# Patient Record
Sex: MALE | Race: WHITE | NOT HISPANIC OR LATINO | Employment: STUDENT | URBAN - METROPOLITAN AREA
[De-identification: names, ages, dates, MRNs, and addresses within clinical notes are randomized per-mention and may not be internally consistent; named-entity substitution may affect disease eponyms.]

---

## 2018-02-05 ENCOUNTER — APPOINTMENT (OUTPATIENT)
Dept: GENERAL RADIOLOGY | Age: 48
End: 2018-02-05
Attending: PHYSICIAN ASSISTANT

## 2018-02-05 ENCOUNTER — HOSPITAL ENCOUNTER (OUTPATIENT)
Age: 48
Setting detail: OBSERVATION
Discharge: HOME | End: 2018-02-07
Attending: EMERGENCY MEDICINE | Admitting: HOSPITALIST

## 2018-02-05 DIAGNOSIS — K52.9 GASTROENTERITIS: ICD-10-CM

## 2018-02-05 DIAGNOSIS — N17.9 ACUTE RENAL FAILURE, UNSPECIFIED ACUTE RENAL FAILURE TYPE (CMD): Primary | ICD-10-CM

## 2018-02-05 LAB
ALBUMIN SERPL-MCNC: 3.8 G/DL (ref 3.6–5.1)
ALBUMIN/GLOB SERPL: 0.9 {RATIO} (ref 1–2.4)
ALP SERPL-CCNC: 59 UNITS/L (ref 45–117)
ALT SERPL-CCNC: 100 UNITS/L
ANION GAP BLD CALC-SCNC: 18 MMOL/L
ANION GAP SERPL CALC-SCNC: 16 MMOL/L (ref 10–20)
AST SERPL-CCNC: 105 UNITS/L
BASOPHILS # BLD AUTO: 0.1 K/MCL (ref 0–0.3)
BASOPHILS NFR BLD AUTO: 1 %
BILIRUB SERPL-MCNC: 0.5 MG/DL (ref 0.2–1)
BUN BLD-MCNC: 49 MG/DL (ref 6–20)
BUN SERPL-MCNC: 53 MG/DL (ref 6–20)
BUN/CREAT SERPL: 31 (ref 7–25)
CA-I BLD ISE-SCNC: 1.11 MMOL/L (ref 1.15–1.29)
CALCIUM SERPL-MCNC: 8.3 MG/DL (ref 8.4–10.2)
CHLORIDE BLD-SCNC: 112 MMOL/L (ref 98–107)
CHLORIDE SERPL-SCNC: 112 MMOL/L (ref 98–107)
CO2 BLD-SCNC: 23 MMOL/L (ref 19–24)
CO2 SERPL-SCNC: 21 MMOL/L (ref 21–32)
CREAT BLD-MCNC: 2.5 MG/DL (ref 0.67–1.17)
CREAT BLD-MCNC: 34 MG/DL
CREAT SERPL-MCNC: 1.72 MG/DL (ref 0.67–1.17)
CROSSMATCH EXPIRE: NORMAL
DIFFERENTIAL METHOD BLD: ABNORMAL
EOSINOPHIL # BLD AUTO: 0.4 K/MCL (ref 0.1–0.5)
EOSINOPHIL NFR SPEC: 5 %
ERYTHROCYTE [DISTWIDTH] IN BLOOD: 14.4 % (ref 11–15)
FLUAV AG SPEC QL IF: NEGATIVE
FLUBV AG SPEC QL IF: NEGATIVE
GLOBULIN SER-MCNC: 4.1 G/DL (ref 2–4)
GLUCOSE BLD-MCNC: 104 MG/DL (ref 65–99)
GLUCOSE SERPL-MCNC: 103 MG/DL (ref 65–99)
HCT VFR BLD CALC: 39.6 % (ref 39–51)
HCT VFR BLD CALC: 41 % (ref 39–51)
HGB BLD-MCNC: 13.4 G/DL (ref 13–17)
HGB BLD-MCNC: 13.9 G/DL (ref 13–17)
LIPASE SERPL-CCNC: 392 UNITS/L (ref 73–393)
LYMPHOCYTES # BLD MANUAL: 3.4 K/MCL (ref 1–4.8)
LYMPHOCYTES NFR BLD MANUAL: 45 %
MCH RBC QN AUTO: 30.4 PG (ref 26–34)
MCHC RBC AUTO-ENTMCNC: 33.8 G/DL (ref 32–36.5)
MCV RBC AUTO: 89.8 FL (ref 78–100)
MONOCYTES # BLD MANUAL: 0.3 K/MCL (ref 0.3–0.9)
MONOCYTES NFR BLD MANUAL: 4 %
NEUTROPHILS # BLD: 3.3 K/MCL (ref 1.8–7.7)
NEUTROPHILS NFR BLD AUTO: 45 %
PLATELET # BLD: 177 K/MCL (ref 140–450)
POTASSIUM BLD-SCNC: 4.5 MMOL/L (ref 3.4–5.1)
POTASSIUM SERPL-SCNC: 4.5 MMOL/L (ref 3.4–5.1)
PROT SERPL-MCNC: 7.9 G/DL (ref 6.4–8.2)
RBC # BLD: 4.41 MIL/MCL (ref 4.5–5.9)
SODIUM BLD-SCNC: 146 MMOL/L (ref 135–145)
SODIUM SERPL-SCNC: 144 MMOL/L (ref 135–145)
SPECIMEN SOURCE: NORMAL
WBC # BLD: 7.3 K/MCL (ref 4.2–11)

## 2018-02-05 PROCEDURE — 96360 HYDRATION IV INFUSION INIT: CPT

## 2018-02-05 PROCEDURE — 85025 COMPLETE CBC W/AUTO DIFF WBC: CPT

## 2018-02-05 PROCEDURE — 80053 COMPREHEN METABOLIC PANEL: CPT

## 2018-02-05 PROCEDURE — 71045 X-RAY EXAM CHEST 1 VIEW: CPT

## 2018-02-05 PROCEDURE — 71045 X-RAY EXAM CHEST 1 VIEW: CPT | Performed by: RADIOLOGY

## 2018-02-05 PROCEDURE — 87804 INFLUENZA ASSAY W/OPTIC: CPT

## 2018-02-05 PROCEDURE — 99283 EMERGENCY DEPT VISIT LOW MDM: CPT

## 2018-02-05 PROCEDURE — 10002807 HB RX 258: Performed by: EMERGENCY MEDICINE

## 2018-02-05 PROCEDURE — 80047 BASIC METABLC PNL IONIZED CA: CPT

## 2018-02-05 PROCEDURE — 83690 ASSAY OF LIPASE: CPT

## 2018-02-05 PROCEDURE — 36415 COLL VENOUS BLD VENIPUNCTURE: CPT

## 2018-02-05 RX ADMIN — SODIUM CHLORIDE 1000 ML: 9 INJECTION, SOLUTION INTRAVENOUS at 21:42

## 2018-02-05 ASSESSMENT — ENCOUNTER SYMPTOMS
PSYCHIATRIC NEGATIVE: 1
SEIZURES: 0
ABDOMINAL DISTENTION: 0
SHORTNESS OF BREATH: 0
DIARRHEA: 1
BACK PAIN: 0
VOMITING: 1
FACIAL SWELLING: 0
CHEST TIGHTNESS: 0
ABDOMINAL PAIN: 0

## 2018-02-05 ASSESSMENT — PAIN SCALES - GENERAL
PAIN_LEVEL_AT_REST: 0
PAINLEVEL_OUTOF10: 0
PAINLEVEL_OUTOF10: 0

## 2018-02-05 ASSESSMENT — MOVEMENT AND STRENGTH ASSESSMENTS: FACE_JAW: NO FACIAL DROOP

## 2018-02-06 PROBLEM — E86.0 DEHYDRATION: Status: ACTIVE | Noted: 2018-02-06

## 2018-02-06 PROBLEM — I10 ESSENTIAL HYPERTENSION: Status: ACTIVE | Noted: 2018-02-06

## 2018-02-06 PROBLEM — N17.9 ACUTE RENAL FAILURE (CMD): Status: ACTIVE | Noted: 2018-02-06

## 2018-02-06 PROBLEM — K52.9 GASTROENTERITIS: Status: ACTIVE | Noted: 2018-02-06

## 2018-02-06 LAB
ALBUMIN SERPL-MCNC: 3.2 G/DL (ref 3.6–5.1)
ALBUMIN/GLOB SERPL: 1 {RATIO} (ref 1–2.4)
ALP SERPL-CCNC: 45 UNITS/L (ref 45–117)
ALT SERPL-CCNC: 125 UNITS/L
ANION GAP SERPL CALC-SCNC: 18 MMOL/L (ref 10–20)
AST SERPL-CCNC: 125 UNITS/L
BILIRUB SERPL-MCNC: 0.3 MG/DL (ref 0.2–1)
BUN SERPL-MCNC: 44 MG/DL (ref 6–20)
BUN/CREAT SERPL: 36 (ref 7–25)
CALCIUM SERPL-MCNC: 8.2 MG/DL (ref 8.4–10.2)
CHLORIDE SERPL-SCNC: 108 MMOL/L (ref 98–107)
CO2 SERPL-SCNC: 20 MMOL/L (ref 21–32)
CREAT SERPL-MCNC: 1.23 MG/DL (ref 0.67–1.17)
GLOBULIN SER-MCNC: 3.3 G/DL (ref 2–4)
GLUCOSE SERPL-MCNC: 75 MG/DL (ref 65–99)
HEMOCCULT STL QL: NEGATIVE
MRSA DNA SPEC QL NAA+PROBE: NOT DETECTED
POTASSIUM SERPL-SCNC: 4.2 MMOL/L (ref 3.4–5.1)
PROT SERPL-MCNC: 6.5 G/DL (ref 6.4–8.2)
SODIUM SERPL-SCNC: 142 MMOL/L (ref 135–145)
SPECIMEN SOURCE: NORMAL

## 2018-02-06 PROCEDURE — 80053 COMPREHEN METABOLIC PANEL: CPT

## 2018-02-06 PROCEDURE — 10002800 HB RX 250 W HCPCS: Performed by: HOSPITALIST

## 2018-02-06 PROCEDURE — 10002807 HB RX 258: Performed by: HOSPITALIST

## 2018-02-06 PROCEDURE — 10002803 HB RX 637: Performed by: HOSPITALIST

## 2018-02-06 PROCEDURE — 10004651 HB RX, NO CHARGE ITEM: Performed by: HOSPITALIST

## 2018-02-06 PROCEDURE — 87641 MR-STAPH DNA AMP PROBE: CPT

## 2018-02-06 PROCEDURE — 99220 INITIAL OBSERVATION CARE,LEVL III: CPT | Performed by: HOSPITALIST

## 2018-02-06 PROCEDURE — G0378 HOSPITAL OBSERVATION PER HR: HCPCS

## 2018-02-06 PROCEDURE — 36415 COLL VENOUS BLD VENIPUNCTURE: CPT

## 2018-02-06 PROCEDURE — 96372 THER/PROPH/DIAG INJ SC/IM: CPT | Performed by: HOSPITALIST

## 2018-02-06 PROCEDURE — 82272 OCCULT BLD FECES 1-3 TESTS: CPT

## 2018-02-06 RX ORDER — POTASSIUM CHLORIDE 1.5 G/1.58G
20 POWDER, FOR SOLUTION ORAL EVERY 4 HOURS PRN
Status: DISCONTINUED | OUTPATIENT
Start: 2018-02-06 | End: 2018-02-07 | Stop reason: HOSPADM

## 2018-02-06 RX ORDER — AMLODIPINE BESYLATE 10 MG/1
10 TABLET ORAL DAILY
Status: DISCONTINUED | OUTPATIENT
Start: 2018-02-07 | End: 2018-02-07

## 2018-02-06 RX ORDER — MELOXICAM 15 MG/1
15 TABLET ORAL 2 TIMES DAILY
COMMUNITY

## 2018-02-06 RX ORDER — ONDANSETRON 2 MG/ML
4 INJECTION INTRAMUSCULAR; INTRAVENOUS EVERY 12 HOURS PRN
Status: DISCONTINUED | OUTPATIENT
Start: 2018-02-06 | End: 2018-02-07 | Stop reason: HOSPADM

## 2018-02-06 RX ORDER — ACETAMINOPHEN 325 MG/1
650 TABLET ORAL EVERY 4 HOURS PRN
Status: DISCONTINUED | OUTPATIENT
Start: 2018-02-06 | End: 2018-02-07 | Stop reason: HOSPADM

## 2018-02-06 RX ORDER — POTASSIUM CHLORIDE 1.5 G/1.58G
40 POWDER, FOR SOLUTION ORAL EVERY 4 HOURS PRN
Status: DISCONTINUED | OUTPATIENT
Start: 2018-02-06 | End: 2018-02-07 | Stop reason: HOSPADM

## 2018-02-06 RX ORDER — ALLOPURINOL 300 MG/1
300 TABLET ORAL DAILY
COMMUNITY

## 2018-02-06 RX ORDER — POTASSIUM CHLORIDE 14.9 MG/ML
40 INJECTION INTRAVENOUS EVERY 4 HOURS PRN
Status: DISCONTINUED | OUTPATIENT
Start: 2018-02-06 | End: 2018-02-07 | Stop reason: HOSPADM

## 2018-02-06 RX ORDER — POTASSIUM CHLORIDE 20 MEQ/1
20 TABLET, EXTENDED RELEASE ORAL EVERY 4 HOURS PRN
Status: DISCONTINUED | OUTPATIENT
Start: 2018-02-06 | End: 2018-02-07 | Stop reason: HOSPADM

## 2018-02-06 RX ORDER — MAGNESIUM SULFATE 1 G/100ML
1 INJECTION INTRAVENOUS DAILY PRN
Status: DISCONTINUED | OUTPATIENT
Start: 2018-02-06 | End: 2018-02-07 | Stop reason: HOSPADM

## 2018-02-06 RX ORDER — FAMOTIDINE 20 MG/1
20 TABLET, FILM COATED ORAL EVERY 12 HOURS SCHEDULED
Status: DISCONTINUED | OUTPATIENT
Start: 2018-02-06 | End: 2018-02-07 | Stop reason: HOSPADM

## 2018-02-06 RX ORDER — HYDRALAZINE HYDROCHLORIDE 50 MG/1
50 TABLET, FILM COATED ORAL EVERY 8 HOURS SCHEDULED
Status: DISCONTINUED | OUTPATIENT
Start: 2018-02-07 | End: 2018-02-07 | Stop reason: HOSPADM

## 2018-02-06 RX ORDER — ACETAMINOPHEN 325 MG/1
650 TABLET ORAL EVERY 4 HOURS PRN
Status: DISCONTINUED | OUTPATIENT
Start: 2018-02-06 | End: 2018-02-06 | Stop reason: SDUPTHER

## 2018-02-06 RX ORDER — BENAZEPRIL/HYDROCHLOROTHIAZIDE 20 MG-25MG
1 TABLET ORAL DAILY
COMMUNITY

## 2018-02-06 RX ORDER — 0.9 % SODIUM CHLORIDE 0.9 %
2 VIAL (ML) INJECTION EVERY 12 HOURS SCHEDULED
Status: DISCONTINUED | OUTPATIENT
Start: 2018-02-06 | End: 2018-02-07 | Stop reason: HOSPADM

## 2018-02-06 RX ORDER — ALBUTEROL SULFATE 90 UG/1
2 AEROSOL, METERED RESPIRATORY (INHALATION) EVERY 4 HOURS PRN
COMMUNITY

## 2018-02-06 RX ORDER — POTASSIUM CHLORIDE 20 MEQ/1
40 TABLET, EXTENDED RELEASE ORAL EVERY 4 HOURS PRN
Status: DISCONTINUED | OUTPATIENT
Start: 2018-02-06 | End: 2018-02-07 | Stop reason: HOSPADM

## 2018-02-06 RX ORDER — 0.9 % SODIUM CHLORIDE 0.9 %
2 VIAL (ML) INJECTION PRN
Status: DISCONTINUED | OUTPATIENT
Start: 2018-02-06 | End: 2018-02-07 | Stop reason: HOSPADM

## 2018-02-06 RX ORDER — HYDROCODONE BITARTRATE AND ACETAMINOPHEN 5; 325 MG/1; MG/1
1-2 TABLET ORAL EVERY 4 HOURS PRN
Status: DISCONTINUED | OUTPATIENT
Start: 2018-02-06 | End: 2018-02-07 | Stop reason: HOSPADM

## 2018-02-06 RX ORDER — SODIUM CHLORIDE 9 MG/ML
INJECTION, SOLUTION INTRAVENOUS CONTINUOUS
Status: DISCONTINUED | OUTPATIENT
Start: 2018-02-06 | End: 2018-02-07

## 2018-02-06 RX ORDER — ENOXAPARIN SODIUM 100 MG/ML
40 INJECTION SUBCUTANEOUS EVERY 24 HOURS
Status: DISCONTINUED | OUTPATIENT
Start: 2018-02-06 | End: 2018-02-07 | Stop reason: HOSPADM

## 2018-02-06 RX ORDER — ALLOPURINOL 300 MG/1
300 TABLET ORAL DAILY
Status: CANCELLED | OUTPATIENT
Start: 2018-02-06

## 2018-02-06 RX ORDER — PROCHLORPERAZINE MALEATE 10 MG
10 TABLET ORAL EVERY 6 HOURS PRN
Status: DISCONTINUED | OUTPATIENT
Start: 2018-02-06 | End: 2018-02-07 | Stop reason: HOSPADM

## 2018-02-06 RX ORDER — POTASSIUM CHLORIDE 14.9 MG/ML
20 INJECTION INTRAVENOUS EVERY 4 HOURS PRN
Status: DISCONTINUED | OUTPATIENT
Start: 2018-02-06 | End: 2018-02-07 | Stop reason: HOSPADM

## 2018-02-06 RX ORDER — ALBUTEROL SULFATE 90 UG/1
2 AEROSOL, METERED RESPIRATORY (INHALATION) EVERY 4 HOURS PRN
Status: DISCONTINUED | OUTPATIENT
Start: 2018-02-06 | End: 2018-02-07 | Stop reason: HOSPADM

## 2018-02-06 RX ADMIN — SODIUM CHLORIDE: 9 INJECTION, SOLUTION INTRAVENOUS at 08:46

## 2018-02-06 RX ADMIN — ENOXAPARIN SODIUM 40 MG: 40 INJECTION SUBCUTANEOUS at 08:49

## 2018-02-06 RX ADMIN — SODIUM CHLORIDE: 9 INJECTION, SOLUTION INTRAVENOUS at 00:56

## 2018-02-06 RX ADMIN — SODIUM CHLORIDE: 9 INJECTION, SOLUTION INTRAVENOUS at 18:13

## 2018-02-06 RX ADMIN — ALBUTEROL SULFATE 2 PUFF: 90 AEROSOL, METERED RESPIRATORY (INHALATION) at 12:06

## 2018-02-06 RX ADMIN — SODIUM CHLORIDE, PRESERVATIVE FREE 2 ML: 5 INJECTION INTRAVENOUS at 01:38

## 2018-02-06 RX ADMIN — ALBUTEROL SULFATE 2 PUFF: 90 AEROSOL, METERED RESPIRATORY (INHALATION) at 23:38

## 2018-02-06 RX ADMIN — FAMOTIDINE 20 MG: 20 TABLET, FILM COATED ORAL at 20:48

## 2018-02-06 ASSESSMENT — ACTIVITIES OF DAILY LIVING (ADL)
ADL_SHORT_OF_BREATH: NO
CHRONIC_PAIN_PRESENT: NO
ADL_BEFORE_ADMISSION: INDEPENDENT
RECENT_DECLINE_ADL: NO
ADL_SCORE: 12

## 2018-02-06 ASSESSMENT — LIFESTYLE VARIABLES
ALCOHOL_USE_STATUS: NO OR LOW RISK WITH VALIDATED TOOL
E-CIGARETTE_USE: NO E-CIGARETTES USE IN THE LAST 30 DAYS
AUDIT-C TOTAL SCORE: 0
HOW MANY STANDARD DRINKS CONTAINING ALCOHOL DO YOU HAVE ON A TYPICAL DAY: 0,1 OR 2
HOW OFTEN DO YOU HAVE A DRINK CONTAINING ALCOHOL: NEVER
HOW OFTEN DO YOU HAVE 6 OR MORE DRINKS ON ONE OCCASION: NEVER

## 2018-02-06 ASSESSMENT — COGNITIVE AND FUNCTIONAL STATUS - GENERAL
ARE YOU DEAF OR DO YOU HAVE SERIOUS DIFFICULTY  HEARING: NO
ARE YOU BLIND OR DO YOU HAVE SERIOUS DIFFICULTY SEEING, EVEN WHEN WEARING GLASSES: NO

## 2018-02-06 ASSESSMENT — PAIN SCALES - GENERAL
PAIN_LEVEL_AT_REST: 0
PAIN_LEVEL_AT_REST: 0

## 2018-02-06 ASSESSMENT — PATIENT HEALTH QUESTIONNAIRE - PHQ9: IS PATIENT ABLE TO COMPLETE PHQ2 OR PHQ9: YES

## 2018-02-07 VITALS
OXYGEN SATURATION: 98 % | RESPIRATION RATE: 18 BRPM | TEMPERATURE: 97.2 F | SYSTOLIC BLOOD PRESSURE: 138 MMHG | HEART RATE: 78 BPM | HEIGHT: 68 IN | DIASTOLIC BLOOD PRESSURE: 82 MMHG | WEIGHT: 256.62 LBS | BODY MASS INDEX: 38.89 KG/M2

## 2018-02-07 LAB
ALBUMIN SERPL-MCNC: 3.1 G/DL (ref 3.6–5.1)
ALBUMIN/GLOB SERPL: 1 {RATIO} (ref 1–2.4)
ALP SERPL-CCNC: 47 UNITS/L (ref 45–117)
ALT SERPL-CCNC: 212 UNITS/L
ANION GAP SERPL CALC-SCNC: 16 MMOL/L (ref 10–20)
AST SERPL-CCNC: 184 UNITS/L
BASOPHILS # BLD AUTO: 0 K/MCL (ref 0–0.3)
BASOPHILS NFR BLD AUTO: 1 %
BILIRUB SERPL-MCNC: 1 MG/DL (ref 0.2–1)
BUN SERPL-MCNC: 23 MG/DL (ref 6–20)
BUN/CREAT SERPL: 23 (ref 7–25)
CALCIUM SERPL-MCNC: 8.1 MG/DL (ref 8.4–10.2)
CHLORIDE SERPL-SCNC: 104 MMOL/L (ref 98–107)
CO2 SERPL-SCNC: 23 MMOL/L (ref 21–32)
CREAT SERPL-MCNC: 1.02 MG/DL (ref 0.67–1.17)
DIFFERENTIAL METHOD BLD: ABNORMAL
EOSINOPHIL # BLD AUTO: 0.3 K/MCL (ref 0.1–0.5)
EOSINOPHIL NFR SPEC: 8 %
ERYTHROCYTE [DISTWIDTH] IN BLOOD: 14.2 % (ref 11–15)
GLOBULIN SER-MCNC: 3.2 G/DL (ref 2–4)
GLUCOSE SERPL-MCNC: 94 MG/DL (ref 65–99)
HCT VFR BLD CALC: 34.1 % (ref 39–51)
HGB BLD-MCNC: 11.4 G/DL (ref 13–17)
LYMPHOCYTES # BLD MANUAL: 1.4 K/MCL (ref 1–4.8)
LYMPHOCYTES NFR BLD MANUAL: 35 %
MCH RBC QN AUTO: 30 PG (ref 26–34)
MCHC RBC AUTO-ENTMCNC: 33.4 G/DL (ref 32–36.5)
MCV RBC AUTO: 89.7 FL (ref 78–100)
MONOCYTES # BLD MANUAL: 0.5 K/MCL (ref 0.3–0.9)
MONOCYTES NFR BLD MANUAL: 12 %
NEUTROPHILS # BLD: 1.8 K/MCL (ref 1.8–7.7)
NEUTROPHILS NFR BLD AUTO: 44 %
PLATELET # BLD: 127 K/MCL (ref 140–450)
POTASSIUM SERPL-SCNC: 4.1 MMOL/L (ref 3.4–5.1)
PROT SERPL-MCNC: 6.3 G/DL (ref 6.4–8.2)
RBC # BLD: 3.8 MIL/MCL (ref 4.5–5.9)
SODIUM SERPL-SCNC: 139 MMOL/L (ref 135–145)
WBC # BLD: 4.1 K/MCL (ref 4.2–11)

## 2018-02-07 PROCEDURE — 10002803 HB RX 637: Performed by: HOSPITALIST

## 2018-02-07 PROCEDURE — 85025 COMPLETE CBC W/AUTO DIFF WBC: CPT

## 2018-02-07 PROCEDURE — 80053 COMPREHEN METABOLIC PANEL: CPT

## 2018-02-07 PROCEDURE — 10002803 HB RX 637: Performed by: INTERNAL MEDICINE

## 2018-02-07 PROCEDURE — 80074 ACUTE HEPATITIS PANEL: CPT

## 2018-02-07 PROCEDURE — G0378 HOSPITAL OBSERVATION PER HR: HCPCS

## 2018-02-07 PROCEDURE — 36415 COLL VENOUS BLD VENIPUNCTURE: CPT

## 2018-02-07 PROCEDURE — 99217 OBSERVATION CARE DISCHARGE: CPT | Performed by: HOSPITALIST

## 2018-02-07 RX ORDER — AMLODIPINE BESYLATE 10 MG/1
10 TABLET ORAL DAILY
Status: DISCONTINUED | OUTPATIENT
Start: 2018-02-07 | End: 2018-02-07 | Stop reason: HOSPADM

## 2018-02-07 RX ADMIN — HYDRALAZINE HYDROCHLORIDE 50 MG: 50 TABLET ORAL at 06:37

## 2018-02-07 RX ADMIN — FAMOTIDINE 20 MG: 20 TABLET, FILM COATED ORAL at 08:56

## 2018-02-07 RX ADMIN — ALBUTEROL SULFATE 2 PUFF: 90 AEROSOL, METERED RESPIRATORY (INHALATION) at 06:41

## 2018-02-07 RX ADMIN — AMLODIPINE BESYLATE 10 MG: 10 TABLET ORAL at 00:04

## 2018-02-07 ASSESSMENT — PAIN SCALES - GENERAL
PAIN_LEVEL_AT_REST: 0

## 2018-02-08 LAB
ANNOTATION COMMENT IMP: NORMAL
ANNOTATION COMMENT IMP: NORMAL
HAV IGM SER QL: NEGATIVE
HBV CORE IGM SER QL: NEGATIVE
HBV SURFACE AG SER QL: NEGATIVE
HCV AB SER QL: NEGATIVE

## 2018-11-12 ENCOUNTER — OFFICE VISIT - RIVER FALLS (OUTPATIENT)
Dept: FAMILY MEDICINE | Facility: CLINIC | Age: 48
End: 2018-11-12

## 2018-11-12 ASSESSMENT — MIFFLIN-ST. JEOR: SCORE: 1951.58

## 2019-02-05 ENCOUNTER — AMBULATORY - RIVER FALLS (OUTPATIENT)
Dept: FAMILY MEDICINE | Facility: CLINIC | Age: 49
End: 2019-02-05

## 2019-02-07 LAB
BUN SERPL-MCNC: 36 MG/DL (ref 7–25)
BUN/CREAT RATIO - HISTORICAL: 31 (ref 6–22)
CALCIUM SERPL-MCNC: 10.3 MG/DL (ref 8.6–10.3)
CHLORIDE BLD-SCNC: 101 MMOL/L (ref 98–110)
CHOLEST SERPL-MCNC: 230 MG/DL
CHOLEST/HDLC SERPL: 4.1 {RATIO}
CO2 SERPL-SCNC: 28 MMOL/L (ref 20–32)
CREAT SERPL-MCNC: 1.18 MG/DL (ref 0.6–1.35)
EGFRCR SERPLBLD CKD-EPI 2021: 73 ML/MIN/1.73M2
ERYTHROCYTE [DISTWIDTH] IN BLOOD BY AUTOMATED COUNT: 12.8 % (ref 11–15)
GLUCOSE BLD-MCNC: 91 MG/DL (ref 65–99)
HCT VFR BLD AUTO: 42.2 % (ref 38.5–50)
HDLC SERPL-MCNC: 56 MG/DL
HGB BLD-MCNC: 14.5 GM/DL (ref 13.2–17.1)
LDLC SERPL CALC-MCNC: 149 MG/DL
MCH RBC QN AUTO: 31.2 PG (ref 27–33)
MCHC RBC AUTO-ENTMCNC: 34.4 GM/DL (ref 32–36)
MCV RBC AUTO: 90.8 FL (ref 80–100)
NONHDLC SERPL-MCNC: 174 MG/DL
PLATELET # BLD AUTO: 209 10*3/UL (ref 140–400)
PMV BLD: 9.6 FL (ref 7.5–12.5)
POTASSIUM BLD-SCNC: 4.2 MMOL/L (ref 3.5–5.3)
PSA SERPL-MCNC: 2.6 NG/ML
RBC # BLD AUTO: 4.65 10*6/UL (ref 4.2–5.8)
SODIUM SERPL-SCNC: 137 MMOL/L (ref 135–146)
TRIGL SERPL-MCNC: 123 MG/DL
URATE SERPL-MCNC: 5.9 MG/DL (ref 4–8)
WBC # BLD AUTO: 6.7 10*3/UL (ref 3.8–10.8)

## 2019-02-08 ENCOUNTER — OFFICE VISIT - RIVER FALLS (OUTPATIENT)
Dept: FAMILY MEDICINE | Facility: CLINIC | Age: 49
End: 2019-02-08

## 2019-02-08 ASSESSMENT — MIFFLIN-ST. JEOR: SCORE: 1967.46

## 2019-03-28 ENCOUNTER — OFFICE VISIT - RIVER FALLS (OUTPATIENT)
Dept: FAMILY MEDICINE | Facility: CLINIC | Age: 49
End: 2019-03-28

## 2019-03-29 ENCOUNTER — COMMUNICATION - RIVER FALLS (OUTPATIENT)
Dept: FAMILY MEDICINE | Facility: CLINIC | Age: 49
End: 2019-03-29

## 2019-03-29 ENCOUNTER — OFFICE VISIT - RIVER FALLS (OUTPATIENT)
Dept: FAMILY MEDICINE | Facility: CLINIC | Age: 49
End: 2019-03-29

## 2019-03-29 LAB
BNP SERPL-MCNC: 98 PG/ML
TROPONIN I - HISTORICAL: <0.01 NG/ML

## 2019-03-29 ASSESSMENT — MIFFLIN-ST. JEOR: SCORE: 1965.19

## 2019-04-01 LAB
% RETIC: 2 %
BASOPHILS # BLD MANUAL: 28 10*3/UL (ref 0–200)
BASOPHILS NFR BLD MANUAL: 0.6 %
EOSINOPHIL # BLD MANUAL: 38 10*3/UL (ref 15–500)
EOSINOPHIL NFR BLD MANUAL: 0.8 %
ERYTHROCYTE [DISTWIDTH] IN BLOOD BY AUTOMATED COUNT: 14.4 % (ref 11–15)
FERRITIN SERPL-MCNC: 304 NG/ML (ref 20–380)
FOLATE: 19.1 NG/ML
HCT VFR BLD AUTO: 36.8 % (ref 38.5–50)
HGB BLD-MCNC: 12.6 GM/DL (ref 13.2–17.1)
IRON SATURATION: 24 (ref 15–60)
IRON SERPL-MCNC: 83 MCG/DL (ref 50–180)
LYMPHOCYTES # BLD MANUAL: 1481 10*3/UL (ref 850–3900)
LYMPHOCYTES NFR BLD MANUAL: 31.5 %
MCH RBC QN AUTO: 30.7 PG (ref 27–33)
MCHC RBC AUTO-ENTMCNC: 34.2 GM/DL (ref 32–36)
MCV RBC AUTO: 89.5 FL (ref 80–100)
MONOCYTES # BLD MANUAL: 169 10*3/UL (ref 200–950)
MONOCYTES NFR BLD MANUAL: 3.6 %
NEUTROPHILS # BLD MANUAL: 2985 10*3/UL (ref 1500–7800)
NEUTROPHILS NFR BLD MANUAL: 63.5 %
PLATELET # BLD AUTO: 190 10*3/UL (ref 140–400)
PMV BLD: 9.5 FL (ref 7.5–12.5)
RBC # BLD AUTO: 4.11 10*6/UL (ref 4.2–5.8)
RETIC (ABSOLUTE) - HISTORICAL: NORMAL (ref 25000–90000)
TIBC - QUEST: 352 (ref 250–425)
VIT B12 SERPL-MCNC: 465 PG/ML (ref 200–1100)
WBC # BLD AUTO: 4.7 10*3/UL (ref 3.8–10.8)

## 2019-05-14 ENCOUNTER — OFFICE VISIT - RIVER FALLS (OUTPATIENT)
Dept: FAMILY MEDICINE | Facility: CLINIC | Age: 49
End: 2019-05-14

## 2019-05-20 ENCOUNTER — OFFICE VISIT - RIVER FALLS (OUTPATIENT)
Dept: FAMILY MEDICINE | Facility: CLINIC | Age: 49
End: 2019-05-20

## 2019-12-23 ENCOUNTER — OFFICE VISIT - RIVER FALLS (OUTPATIENT)
Dept: FAMILY MEDICINE | Facility: CLINIC | Age: 49
End: 2019-12-23

## 2019-12-23 ASSESSMENT — MIFFLIN-ST. JEOR: SCORE: 1842.72

## 2022-02-11 VITALS
HEIGHT: 67 IN | HEART RATE: 76 BPM | BODY MASS INDEX: 39.79 KG/M2 | SYSTOLIC BLOOD PRESSURE: 126 MMHG | TEMPERATURE: 98.4 F | DIASTOLIC BLOOD PRESSURE: 68 MMHG | WEIGHT: 253.5 LBS

## 2022-02-11 VITALS
TEMPERATURE: 96 F | WEIGHT: 226 LBS | BODY MASS INDEX: 35.47 KG/M2 | DIASTOLIC BLOOD PRESSURE: 60 MMHG | HEIGHT: 67 IN | OXYGEN SATURATION: 97 % | SYSTOLIC BLOOD PRESSURE: 104 MMHG | RESPIRATION RATE: 16 BRPM | HEART RATE: 64 BPM

## 2022-02-11 VITALS
SYSTOLIC BLOOD PRESSURE: 146 MMHG | HEART RATE: 69 BPM | BODY MASS INDEX: 39.24 KG/M2 | DIASTOLIC BLOOD PRESSURE: 90 MMHG | WEIGHT: 250 LBS | HEIGHT: 67 IN

## 2022-02-11 VITALS
WEIGHT: 249.5 LBS | SYSTOLIC BLOOD PRESSURE: 112 MMHG | HEART RATE: 77 BPM | BODY MASS INDEX: 38.79 KG/M2 | WEIGHT: 253 LBS | DIASTOLIC BLOOD PRESSURE: 76 MMHG | SYSTOLIC BLOOD PRESSURE: 142 MMHG | SYSTOLIC BLOOD PRESSURE: 150 MMHG | HEART RATE: 71 BPM | BODY MASS INDEX: 39.71 KG/M2 | OXYGEN SATURATION: 99 % | DIASTOLIC BLOOD PRESSURE: 84 MMHG | OXYGEN SATURATION: 97 % | HEART RATE: 84 BPM | DIASTOLIC BLOOD PRESSURE: 90 MMHG | HEIGHT: 67 IN | TEMPERATURE: 97.4 F | RESPIRATION RATE: 12 BRPM

## 2022-02-16 NOTE — RESULTS
Patient:   LEONEL BUTLER            MRN: [id number]            FIN: [id number]               Age:   49 years     Sex:  Male     :  1970   Associated Diagnoses:   None   Author:   David Navarro MD      Procedure   EKG procedure   Date/ Time:  3/28/2019 7:14:00 PM.     Supervised by: David Navarro MD.     Indication: dyspnea.     Position: supine.     EKG findings   Interpretation: David Navarro MD.     Rhythm: heart rate  63  beats/min, sinus normal.     Axis: normal axis, normal configuration.     Intervals: normal.     P waves: normal.     QRS complex: normal.     ST-T-U complex: normal.     Interpretation: normal EKG.        Impression and Plan   Diagnosis     Normal EKG.

## 2022-02-16 NOTE — NURSING NOTE
Quick Intake Entered On:  2/8/2019 1:36 PM CST    Performed On:  2/8/2019 1:36 PM CST by David Navarro MD               Summary   Systolic Blood Pressure :   126 mmHg   Diastolic Blood Pressure :   68 mmHg   Mean Arterial Pressure :   87 mmHg   BP Site :   Right arm   BP Method :   Manual   David Navarro MD - 2/8/2019 1:36 PM CST

## 2022-02-16 NOTE — LETTER
(Inserted Image. Unable to display)     February 24, 2020      LEONEL BUTLER  [address]  [place], [place] [id number]          Dear LEONEL,      Thank you for selecting [facility] (previously [place] [place] [facility] [facility]) for your healthcare needs.      Our records indicate you are due for the following services:     Fasting Lab Tests ~ Please do not eat or drink anything 10 hours prior to your scheduled appointment time.  (Water and any medications that you may need are allowed unless directed otherwise.)    If you had your labs done at another facility or with Direct Access Lab Testing at [facility], please bring in a copy of the results to your next visit, mail a copy, or drop off a copy of your results to your Healthcare Provider.      To schedule an appointment or if you have further questions, please contact your primary clinic:   [facility]       [phone]   [facility]       [phone]              [facility]     [phone]      Powered by [employer] and [employer]    Sincerely,    David Navarro MD, FACP

## 2022-02-16 NOTE — TELEPHONE ENCOUNTER
Entered by Sara Sutherland CMA on October 23, 2019 1:59:21 PM CDT  ---------------------  From: Sara Sutherland CMA   To: [facility] #[id number]    Sent: 10/23/2019 1:59:21 PM CDT  Subject: Medication Management     ** Submitted: **  Order:albuterol (ProAir HFA 90 mcg/inh inhalation aerosol)  2 puff(s)  NEB  qid  Qty:  1 EA        Refills:  0          Substitutions Allowed     PRN  AS NEEDED FOR WHEEZING      Route To [facility] #[id number]    Signed by Sara Sutherland CMA  10/23/2019 1:58:00 PM    ** Submitted: **  Complete:albuterol (ProAir HFA 90 mcg/inh inhalation aerosol)   Signed by Sara Sutherland CMA  10/23/2019 1:59:00 PM    ** Not Approved:  **  albuterol (PROAIR HFA ORAL INH (200  PFS) 8.5G)  INHALE 2 PUFFS BY MOUTH FOUR TIMES DAILY AS NEEDED FOR WHEEZING  Qty:  25.5 gm        Days Supply:  75        Refills:  0          Substitutions Allowed     Route To [facility] #[id number]   Signed by Sara Sutherland CMA            ------------------------------------------  From: [facility] #[id number]  To: David Navarro MD  Sent: October 23, 2019 9:48:52 AM CDT  Subject: Medication Management  Due: October 24, 2019 9:48:52 AM CDT    ** On Hold Pending Signature **  Drug: albuterol (ProAir HFA 90 mcg/inh inhalation aerosol)  2 PUFF(S) INHALE QID,PRN:AS NEEDED FOR WHEEZING  Quantity: 3 unknown unit  Days Supply: 0  Refills: 2  Substitutions Allowed  Notes from Pharmacy:     Dispensed Drug: albuterol (ProAir HFA 90 mcg/inh inhalation aerosol)  INHALE 2 PUFFS BY MOUTH FOUR TIMES DAILY AS NEEDED FOR WHEEZING  Quantity: 25.5 gm  Days Supply: 75  Refills: 0  Substitutions Allowed  Notes from Pharmacy:   ------------------------------------------Med Refill      Date of last office visit and reason:  5/14/19; med f/u      Date of last Med Check / Px:   5/14/19  Date of last labs pertaining to med:  2/5/19    RTC order in chart:  yes; due now    For Protocol refill, has patient been contacted:   msg sent to pharmacy

## 2022-02-16 NOTE — TELEPHONE ENCOUNTER
---------------------  From: Christian DSOUZA, Nhung SARABIA   To: Unit [address] Pool ([id number]_[facility]) ;     Cc: Phone Messages Pool (32224_[place] [facility]);      Sent: 2/4/2019 10:21:02 AM CST  Subject: Lab appointment     Patient has a non-fasting lab appt today at 1500.    Per JDL he is due for a fasting lab for: BMP, CBC, PSA, Uric Acid, Lipids    I LVM on listed number for pt to call back.    Need to see if pt wants to do all but lipids today or if he can come in fasting today or another day.Called and talked to patient.  He did eat at 10:00 today so he is planning to reschedule labs.  He was transferred to scheduling.Noted

## 2022-02-16 NOTE — LETTER
(Inserted Image. Unable to display)   February 07, 2019      LEONEL BUTLER      [address]  [place], [place] [id number]        Dear LEONEL,    Thank you for selecting [facility] (previously [facility]) for your healthcare needs.  Below you will find the results of your recent tests done at our clinic.    Elevated total cholesterol and LDL. Please schedule an appointment.      Result Name Current Result Reference Range   Sodium Level (mmol/L)  137 2/5/2019 135 - 146   Potassium Level (mmol/L)  4.2 2/5/2019 3.5 - 5.3   Chloride Level (mmol/L)  101 2/5/2019 98 - 110   CO2 Level (mmol/L)  28 2/5/2019 20 - 32   Glucose Level (mg/dL)  91 2/5/2019 65 - 99   BUN (mg/dL) ((H)) 36 2/5/2019 7 - 25   Creatinine Level (mg/dL)  1.18 2/5/2019 0.60 - 1.35   eGFR (mL/min/1.73m2)  73 2/5/2019 > OR = 60 -    Calcium Level (mg/dL)  10.3 2/5/2019 8.6 - 10.3   Uric Acid (mg/dL)  5.9 2/5/2019 4.0 - 8.0   Cholesterol (mg/dL) ((H)) 230 2/5/2019  - <200   Non-HDL Cholesterol ((H)) 174 2/5/2019  - <130   HDL (mg/dL)  56 2/5/2019 >40 -    Cholesterol/HDL Ratio  4.1 2/5/2019  - <5.0   LDL ((H)) 149 2/5/2019    Triglyceride (mg/dL)  123 2/5/2019  - <150   PSA (ng/mL)  2.6 2/5/2019  - < OR = 4.0   WBC  6.7 2/5/2019 3.8 - 10.8   Hgb (gm/dL)  14.5 2/5/2019 13.2 - 17.1   Platelet  209 2/5/2019 140 - 400       Please contact me or my assistant at [phone] if you have any questions.     Sincerely,        David Navarro MD

## 2022-02-16 NOTE — TELEPHONE ENCOUNTER
---------------------  From: Ita Marquis CMA   Sent: 5/7/2019 2:01:24 PM CDT  Subject: General Message-Advair     Phone Message    PCP:   VIVIANA      Time of Call:  1341       Person Calling:  self  Phone number:  [phone]    Returned call at: 1358    Note:   pt asking for 90-day script of his Advari.  Was refilled x 1year 3/29/2019 for 30day supply with 11rf's, sent in new script for 90-day supply

## 2022-02-16 NOTE — PROGRESS NOTES
Chief Complaint    Patient presents today to f/u on his labored breathing. Continues to cough regularly.  History of Present Illness      Patient is doing much better.  No recurrent wheezing or dyspnea.  Blood pressure is well controlled.  He has an upper respiratory infection currently with nasal congestion and cough.  No fever, chills, sputum production, dyspnea, hemoptysis, edema, headache or myalgia.  Review of Systems      No abdominal pain, rectal bleeding, or other GI complaints.  Physical Exam   Vitals & Measurements    T: 97.4   F (Tympanic)  HR: 71(Peripheral)  RR: 12  BP: 112/76  SpO2: 99%     WT: 249.5 lb       Patient appears comfortable.  Alert and oriented.  HEENT exam is unremarkable.  Neck supple no adenopathy or thyromegaly.  Chest clear to auscultation.  Cardiac exam regular no edema.  Assessment/Plan       Active asthma (J45.909)         Stable on current regimen.  He has had asthma since age 10.                Acute URI (J06.9)         Progressing as expected.  Reviewed symptomatic measures.  Return if not better.                Bilateral chronic knee pain (M25.561)         Patient wishes to see an orthopedist about this problem.         Ordered:          Referral (Request), 05/14/19 15:19:00 CDT, Referred to: Orthopaedics, Referred to: knee pain, Bilateral chronic knee pain                Gout (M10.9)         No recent attacks.                HTN, goal below 130/80 (I10)         Controlled.                Normocytic normochromic anemia (D64.9)         Normal iron studies, folate, B12.  Repeat CBC in a couple of months.                Obesity (E66.9)         Encouraged weight loss.                Orders:         Return to Clinic (Request), Return in 3-4 weeks         Return to Clinic (Request), Return in 2 months  Patient Information     Name:LEONEL BUTLER      Address:      [address] [address]     Sex:Male     YOB: 1970      [phone]     Emergency Contact:JUANCARLOS BUTLER     MRN:[id number]     FIN:[id number]     Location:[facility]     Date of Service:05/14/2019      Primary Care Physician:       NONE ,       Attending Physician:       David Navarro MD, [phone]  Problem List/Past Medical History    Ongoing     Active asthma     Chronic kidney disease stage 3     [place] cardiac risk <10% in next 10 years       Comments: 10 yeaar risk 3.5%     Gout     HTN, goal below 130/80     Normocytic normochromic anemia     Obesity     Osteoarthritis     Varicose veins of legs    Historical     Normochromic anemia     Viral pericarditis  Procedure/Surgical History     Creation of pericardial window (2006)  Medications        allopurinol 300 mg oral tablet: 300 mg, 1 tab(s), Oral, daily, 90 tab(s), 3 Refill(s).        ProAir HFA 90 mcg/inh inhalation aerosol: 2 puff(s), Inhale, qid, PRN: as needed for wheezing, 3 EA, 3 Refill(s).        irbesartan 150 mg oral tablet: 1 tab(s), Oral, daily, 90 tab(s), 3 Refill(s).        amLODIPine 5 mg oral tablet: 5 mg, 1 tab(s), PO, Daily, 90 tab(s), 3 Refill(s).        Advair Diskus 250 mcg-50 mcg inhalation powder: 1 puff(s), INH, BID, 180 EA, 3 Refill(s).         Allergies    Animal Dander    Dust    Mold    hydroCHLOROthiazide (Hyponatremia)    penicillins  Social History    Smoking Status - 05/14/2019     Never smoker     Alcohol      Current, 11/12/2018     Employment and Education      Employed, 11/12/2018     Home and Environment      Marital status: ., 11/12/2018     Tobacco      Never (less than 100 in lifetime), 11/12/2018  Family History    CA - Cancer of prostate: Father.    Cancer of cervix: Mother.    Epilepsy: Mother.    Gout: Father.    Hypertension: Mother and Father.  Immunizations      Vaccine Date Status      pneumococcal (PPSV23) 11/12/2018 Given      influenza virus vaccine, inactivated 11/05/2018 Recorded      influenza virus vaccine,  inactivated 09/20/2017 Recorded      influenza virus vaccine, inactivated 11/11/2016 Recorded      Hep B 10/24/2014 Recorded      Hep B 09/14/2014 Recorded      varicella 01/31/2014 Recorded      varicella 12/18/2013 Recorded      tetanus/diphth/pertuss (Tdap) adult/adol 11/21/2013 Recorded      MMR (measles/mumps/rubella) 11/21/2013 Recorded      MMR (measles/mumps/rubella) 08/03/2011 Recorded      mumps 04/07/1973 Recorded      rubella 08/10/1971 Recorded      rubella 08/10/1971 Recorded      measles 08/10/1971 Recorded      IPV 02/20/1971 Recorded      IPV 1970 Recorded      DTaP 1970 Recorded      DTaP 1970 Recorded      DTaP 1970 Recorded  Lab Results          Lab Results (Last 4 results within 90 days)           Folate: 19.1 ng/mL (03/29/19 10:55:00)          Vitamin B12 Level: 465 pg/mL [200 pg/mL - 1100 pg/mL] (03/29/19 10:55:00)          Troponin I: <0.010 (03/29/19 11:09:00)          Iron Level: 83 mcg/dL [50 mcg/dL - 180 mcg/dL] (03/29/19 10:55:00)          TIBC: 352 [250  - 425] (03/29/19 10:55:00)          Iron Saturation: 24 [15  - 60] (03/29/19 10:55:00)          Ferritin: 304 ng/mL [20 ng/mL - 380 ng/mL] (03/29/19 10:55:00)          B-Natriuretic Peptide (BNP): 98 (03/29/19 11:09:00)          WBC: 4.7 [3.8  - 10.8] (03/29/19 10:55:00)          RBC: 4.11 Low [4.2  - 5.8] (03/29/19 10:55:00)          Hgb: 12.6 gm/dL Low [13.2 gm/dL - 17.1 gm/dL] (03/29/19 10:55:00)          Hct: 36.8 % Low [38.5 % - 50 %] (03/29/19 10:55:00)          MCV: 89.5 fL [80 fL - 100 fL] (03/29/19 10:55:00)          MCH: 30.7 pg [27 pg - 33 pg] (03/29/19 10:55:00)          MCHC: 34.2 gm/dL [32 gm/dL - 36 gm/dL] (03/29/19 10:55:00)          RDW: 14.4 % [11 % - 15 %] (03/29/19 10:55:00)          Platelet: 190 [140  - 400] (03/29/19 10:55:00)          MPV: 9.5 fL [7.5 fL - 12.5 fL] (03/29/19 10:55:00)          Lymphocytes: 31.5 % (03/29/19 10:55:00)          Abs Lymphocytes: 1481 [850  - 3900] (03/29/19  10:55:00)          Neutrophils: 63.5 % (03/29/19 10:55:00)          Abs Neutrophils: 2985 [1500  - 7800] (03/29/19 10:55:00)          Monocytes: 3.6 % (03/29/19 10:55:00)          Abs Monocytes: 169 Low [200  - 950] (03/29/19 10:55:00)          Eosinophils: 0.8 % (03/29/19 10:55:00)          Abs Eosinophils: 38 [15  - 500] (03/29/19 10:55:00)          Basophils: 0.6 % (03/29/19 10:55:00)          Abs Basophils: 28 [0  - 200] (03/29/19 10:55:00)          Peripheral Blood Path Rev: See comment (03/29/19 10:55:00)          Reticulocyte: 2 % (03/29/19 10:55:00)          Retic Abs#: [id number] [[id number]  - [id number]] (03/29/19 10:55:00)

## 2022-02-16 NOTE — TELEPHONE ENCOUNTER
---------------------  From: Cary Vasquez LPN (Phone Messages Pool ([id number]))   To: Critical access hospital Message Pool ([id number]);     Sent: 1/8/2019 10:27:49 AM CST  Subject: Amlodipine       Phone Message    PCP:   VIVIANA       Time of Call:  0936       Person Calling:  Patient   Phone number:  [phone]    Returned call at:     Note:   Patient is calling to let J know that his amlodipine increase  is not working.  He has swelling in his extremities and would like Critical access hospital to prescribe something different.  His BP he states is still elevated but no reading given to give to Critical access hospital.  He would like new Rx sent to [facility].  Please advise.     Last office visit and reason:  11/12/2018 New Patient Visit---------------------  From: Genesis Verma MA ([employer] Message Pool ([id number]))   To: David Navarro MD;     Sent: 1/8/2019 10:30:05 AM CST  Subject: FW: Amlodipine---------------------  From: David Navarro MD   To: [employer] Message Pool ([id number]);     Sent: 1/8/2019 10:39:05 AM CST  Subject: RE: Amlodipine     Decrease Amlodipine to 5 mg daily. Irbesartan 150 mg daily. BMP and F/U visit in 2 weeks.---------------------  From: Genesis Verma MA ([employer] Message Pool ([id number]))   To: Rosa Ferris CMA;     Sent: 1/8/2019 10:43:26 AM CST  Subject: FW: AmlodipineLMTCB w/ instructions. CC following pt for BP.   Addended by: LEONARDO REYES on: 12/26/2018 06:28 PM     Modules accepted: Orders

## 2022-02-16 NOTE — LETTER
(Inserted Image. Unable to display)   April 01, 2019      LEONEL BUTLER      [address]  [place], [place] [id number]        Dear LEONEL,    Thank you for selecting [facility] ([facility] for your healthcare needs.  Below you will find the results of your recent tests done at our clinic.     Very mild anemia without iron or B vitamin deficiency or other abnormality. Repeat Complete blood count in 3 months.      Result Name Current Result Previous Result Reference Range   Folate (ng/mL)  19.1 3/29/2019     Vitamin B12 Level (pg/mL)  465 3/29/2019  200 - 1,100   Iron Level (mcg/dL)  83 3/29/2019  50 - 180   TIBC  352 3/29/2019  250 - 425   Iron Saturation  24 3/29/2019  15 - 60   Ferritin (ng/mL)  304 3/29/2019  20 - 380   WBC  4.7 3/29/2019  6.7 2/5/2019 3.8 - 10.8   RBC ((L)) 4.11 3/29/2019  4.65 2/5/2019 4.20 - 5.80   Hgb (gm/dL) ((L)) 12.6 3/29/2019  14.5 2/5/2019 13.2 - 17.1   Hct (%) ((L)) 36.8 3/29/2019  42.2 2/5/2019 38.5 - 50.0   MCV (fL)  89.5 3/29/2019  90.8 2/5/2019 80.0 - 100.0   MCH (pg)  30.7 3/29/2019  31.2 2/5/2019 27.0 - 33.0   MCHC (gm/dL)  34.2 3/29/2019  34.4 2/5/2019 32.0 - 36.0   RDW (%)  14.4 3/29/2019  12.8 2/5/2019 11.0 - 15.0   Platelet  190 3/29/2019  209 2/5/2019 140 - 400   MPV (fL)  9.5 3/29/2019  9.6 2/5/2019 7.5 - 12.5   Lymphocytes (%)  31.5 3/29/2019     Abs Lymphocytes  1,481 3/29/2019  850 - 3,900   Neutrophils (%)  63.5 3/29/2019     Abs Neutrophils  2,985 3/29/2019  1,500 - 7,800   Monocytes (%)  3.6 3/29/2019     Abs Monocytes ((L)) 169 3/29/2019  200 - 950   Eosinophils (%)  0.8 3/29/2019     Abs Eosinophils  38 3/29/2019  15 - 500   Basophils (%)  0.6 3/29/2019     Abs Basophils  28 3/29/2019  0 - 200   Peripheral Blood Path Rev  See comment 3/29/2019     Reticulocyte (%)  2.0 3/29/2019     Retic Abs#  82,200 3/29/2019  25,000 - 90,000       Please contact me or my assistant at [phone] if you have any  questions.     Sincerely,        David Navarro MD

## 2022-02-16 NOTE — TELEPHONE ENCOUNTER
---------------------  From: Radha Oneal CMA (Phone Messages Pool ([id number]))   To: [employer] Message Pool ([id number]);     Sent: 3/28/2019 10:46:19 AM CDT  Subject: SOB/asthma     FYI    Phone message    PCP: none asked for JDL    Person calling: Kailash  Phone number: [phone]  Time message left: 1026  Return call time: _    Reason: Kailash called and left a message  he stated that he was seen in the ER 3 days ago for breathing problems, the ER provider placed him on prednisone but he states that he is still having labored breathing, he does have asthma. He stated in the ER they did an EKG, chest xray and an US. He states he is still not feeling well and feels the prednisone is not working the way it should. Requesting apt to follow up from ER and to recheck his breathing.      Transferred to: tisha Oneal CMA---------------------  From: Genesis Verma MA ([employer] Message Pool ([id number]))   To: David Navarro MD;     Sent: 3/28/2019 10:53:51 AM CDT  Subject: FW: SOB/asthma     patient has appt today---------------------  From: David Navarro MD   To: [employer] Message Pool ([id number]);     Sent: 3/28/2019 11:36:43 AM CDT  Subject: RE: SOB/asthma     ok, please get ER record

## 2022-02-16 NOTE — LETTER
(Inserted Image. Unable to display)     January 21, 2019      LEONEL BUTLER  [address]  [place], [place] [id number]          Dear LEONEL,      Thank you for selecting [facility] ([facility]viously [facility] [place] [place]) for your healthcare needs.    Our records indicate you are due for the following services:    Hypertension check ~ please remember to bring your at-home blood pressure readings with you to your appointment.     Non-Fasting Labs.    If you had your labs done at another facility or with Direct Access Lab Testing at [facility], please bring in a copy of the results to your next visit, mail a copy, or drop off a copy of your results to your Healthcare Provider.    You are due for lab work and an office visit; please schedule the lab appointment 1 week before the office visit.  This will assure all results are available to discuss with your provider during your visit.    **It is very helpful if you bring your medication bottles to your appointment.  This assures we have all of your current medications, including strength and dosing information, documented accurately in your medical record.    To schedule an appointment or if you have further questions, please contact your primary clinic:   [facility]       [phone]   [facility]       [phone]              [facility]     [phone]      Powered by [employer] and [employer]    Sincerely,    David Navarro MD, FACP

## 2022-02-16 NOTE — CARE COORDINATION
ACTION PLAN   Goal(s):        Today s Date:                                                                                   Goal(s) completion date:      Steps to be taken to manage BP When will I accomplish these steps Barriers Status   (12/18/18) BP elevated. On Amlodipine-add Irbesartan 150mg qd and f/u with JDL in 2 weeks along w/ BMP.    (12/19/18) Pt called-would like to increase amlodipine before adding another medicine. Per JDL that would be OK to increase to 10mg qd and will watch for signs of lower extremity edema. CSS only BP check in 1 mo    (1/8/19) Pt calls c/o increase LE edema. Per JDL decrease Amlodipine back to 5mg qd and start on Irbesartan 150mg qd. BMP and OV in 2 weeks. LM w/ info for pt and advised to call if needed. RTC in place.     Steps to be taken When will I accomplish these steps Barriers Status             Steps to be taken When will I accomplish these steps Barriers Status             Steps to be taken When will I accomplish these steps Barriers Status

## 2022-02-16 NOTE — CARE COORDINATION
ACTION PLAN   Goal(s):        Today s Date:                                                                                   Goal(s) completion date:      Steps to be taken to manage BP When will I accomplish these steps Barriers Status   (12/18/18) BP elevated. On Amlodipine-add Irbesartan 150mg qd and f/u with JDL in 2 weeks along w/ BMP.    (12/19/18) Pt called-would like to increase amlodipine before adding another medicine. Per JDL that would be OK to increase to 10mg qd and will watch for signs of lower extremity edema. CSS only BP check in 1 mo     Steps to be taken When will I accomplish these steps Barriers Status             Steps to be taken When will I accomplish these steps Barriers Status             Steps to be taken When will I accomplish these steps Barriers Status

## 2022-02-16 NOTE — LETTER
(Inserted Image. Unable to display)     April 29, 2019      LEONEL BUTLER  [address]  [place], [place] [id number]          Dear LEONEL,      Thank you for selecting [facility] [place] [facility] ([facility]viously [facility]) for your healthcare needs.      Our records indicate you are due for the following services:     Follow-up office visit.      To schedule an appointment or if you have further questions, please contact your primary clinic:   [facility]       [phone]   [facility]       [phone]              [facility]     [phone]      Powered by [employer] and [employer]    Sincerely,    David Navarro MD, FACP

## 2022-02-16 NOTE — TELEPHONE ENCOUNTER
Entered by Sara Sutherland CMA on March 11, 2021 8:05:00 PM CST  ---------------------  From: Sara Sutherland CMA   [id number]    Sent: 3/11/2021 8:05:00 PM CST  Subject: Medication Management     ** Submitted: **  Order:irbesartan (irbesartan 150 mg oral tablet)  1 tab(s)  Oral  daily  Qty:  30 tab(s)        Refills:  0          Substitutions Allowed     Route To [facility] #[id number]    Signed by Sara Sutherland CMA  3/12/2021 2:04:00 AM [facility]    ** Submitted: **  Complete:irbesartan (irbesartan 150 mg oral tablet)   Signed by Sara Sutherland CMA  3/12/2021 2:04:00 AM [facility]    ** Not Approved:  **  irbesartan (IRBESARTAN 150MG TABLETS)  TAKE 1 TABLET BY MOUTH DAILY  Qty:  90 tab(s)        Days Supply:  90        Refills:  0          Substitutions Allowed     Route To [facility] #[id number]   Signed by Sara Sutherland CMA            ** Submitted: **  Order:fluticasone-salmeterol (Advair Diskus 250 mcg-50 mcg inhalation powder)  1 puff  Inhale  bid  Qty:  1 EA        Refills:  0          Substitutions Allowed     Route To [facility] #[id number]    Signed by Sara Sutherland CMA  3/12/2021 2:03:00 AM [facility]    ** Submitted: **  Complete:fluticasone-salmeterol (Advair Diskus 250 mcg-50 mcg inhalation powder)   Signed by Sara Sutherland CMA  3/12/2021 2:04:00 AM [facility]    ** Not Approved:  **  fluticasone-salmeterol (ADVAIR DISKUS 250/50MCG (YELLOW) 60)  INHALE 1 PUFF BY MOUTH TWICE DAILY  Qty:  180 EA        Days Supply:  90        Refills:  0          Substitutions Allowed     Route To [facility] #[id number]   Signed by Sara Sutherland CMA            ** Submitted: **  Order:amLODIPine (amLODIPine 5 mg oral tablet)  1 tab(s)  Oral  daily  Qty:  30 tab(s)        Refills:  0          Substitutions Allowed     Route To Pharmacy - [facility] #[id number]    Signed by Sara Sutherland CMA  3/12/2021 2:03:00 AM [facility]    ** Submitted: **  Complete:amLODIPine (amLODIPine 5 mg  oral tablet)   Signed by Sara Sutherland CMA  3/12/2021 2:03:00 AM [facility]    ** Not Approved:  **  amLODIPine (AMLODIPINE BESYLATE 5MG TABLETS)  TAKE 1 TABLET BY MOUTH EVERY DAY  Qty:  90 tab(s)        Days Supply:  90        Refills:  0          Substitutions Allowed     Route To Pharmacy - [facility] #[id number]   Signed by Sara Sutherland CMA            ------------------------------------------  From: [facility] #[id number]  To: David Navarro MD  Sent: March 11, 2021 11:22:02 AM CST  Subject: Medication Management  Due: March 3, 2021 5:24:05 PM CST     ** On Hold Pending Signature **     Dispensed Drug: amLODIPine (amLODIPine 5 mg oral tablet), TAKE 1 TABLET BY MOUTH EVERY DAY  Quantity: 90 tab(s)  Days Supply: 90  Refills: 0  Substitutions Allowed  Notes from Pharmacy:     ** On Hold Pending Signature **     Dispensed Drug: irbesartan (irbesartan 150 mg oral tablet), TAKE 1 TABLET BY MOUTH DAILY  Quantity: 90 tab(s)  Days Supply: 90  Refills: 0  Substitutions Allowed  Notes from Pharmacy:     ** On Hold Pending Signature **     Dispensed Drug: fluticasone-salmeterol (Advair Diskus 250 mcg-50 mcg inhalation powder), INHALE 1 PUFF BY MOUTH TWICE DAILY  Quantity: 180 EA  Days Supply: 90  Refills: 0  Substitutions Allowed  Notes from Pharmacy:  ------------------------------------------12/23/19 refills  rtc overdue - new rtc placed  protocol fill sent

## 2022-02-16 NOTE — LETTER
(Inserted Image. Unable to display)         March 15, 2021      LEONEL BUTLER  [address] [id number]          Dear LEONEL,      Thank you for selecting [facility] for your healthcare needs.    Our records indicate you are due for the following services:    Hypertension check ~ please remember to bring your at-home blood pressure readings with you to your appointment.     Fasting Lab Tests ~ Please do not eat or drink anything 10 hours prior to your scheduled appointment time.  (Water and any medications that you may need are allowed unless directed otherwise.)    If you had your labs done at another facility or with Direct Access Lab Testing at [facility], please bring in a copy of the results to your next visit, mail a copy, or drop off a copy of your results to your Healthcare Provider.    (FYI   Regarding office visits: In some instances, a video visit or telephone visit may be offered as an option.)    You are due for lab work and an office visit; please schedule the lab appointment 1 week before the office visit.  This will assure all results are available to discuss with your Healthcare Provider during your visit.    **It is very helpful if you bring your medication bottles to your appointment.  This assures we have all of your current medications, including strength and dosing information, documented accurately in your medical record.    To schedule an appointment or if you have further questions, please contact your clinic at [phone].      Powered by [employer] and [employer]    Sincerely,    David Navarro MD, FACP

## 2022-02-16 NOTE — TELEPHONE ENCOUNTER
---------------------  From: Kelly Adair CMA   Sent: 3/8/2019 9:13:17 AM CST  Subject: RX Irbesartan Refill req     Kailash called the CC office this am at 702.  He is asking for a 90 day refill instead of 30 of his Irbesartan 150mg 1 tab daily.  He is leaving for [place] on Tuesday and would like to have this asap.      CC looked in his chart and on 2/8/19 VIVIANA sent in a refill for #90 with 3 refills.  CC then called [facility] and verified this Rx was sent and received.  They will fill this medication and have it ready for the patient.  CC Called and LMTCB for Kailash at 912am.    Kelly Adair CMA

## 2022-02-16 NOTE — NURSING NOTE
Comprehensive Intake Entered On:  3/28/2019 5:45 PM CDT    Performed On:  3/28/2019 5:38 PM CDT by Priya Grimaldo LPN               Summary   Chief Complaint :   follow up ER, continued labored breathing. continued from tuesday. not much improvement. right foot and leg swelling, satuday. just came back from a 10 day trip from [place]   Weight Estimated :   275 lb(Converted to: 275 lb 0 oz, 124.738 kg)    Systolic Blood Pressure :   150 mmHg (HI)    Diastolic Blood Pressure :   90 mmHg (HI)    Mean Arterial Pressure :   110 mmHg   Peripheral Pulse Rate :   84 bpm   BP Site :   Right arm   BP Method :   Manual   HR Method :   Electronic   Oxygen Saturation :   97 %   Priya Grimaldo LPN - 3/28/2019 5:38 PM CDT   Health Status   Allergies Verified? :   Yes   Medication History Verified? :   Yes   Medical History Verified? :   Yes   Pre-Visit Planning Status :   Completed   Tobacco Use? :   Never smoker   Priya Grimaldo LPN - 3/28/2019 5:38 PM CDT   Consents   Consent for Immunization Exchange :   Consent Granted   Consent for Immunizations to Providers :   Consent Granted   Priya Grimaldo LPN - 3/28/2019 5:38 PM CDT   Meds / Allergies   (As Of: 3/28/2019 5:45:21 PM CDT)   Allergies (Active)   Animal Dander  Estimated Onset Date:   Unspecified ; Comments:     Comment 1: cat hair   ; Created By:   Annetta Bradshaw; Reaction Status:   Active ; Category:   Other ; Substance:   Animal Dander ; Type:   Allergy ; Updated By:   Annetta Bradshaw; Reviewed Date:   3/28/2019 5:43 PM CDT      Dust  Estimated Onset Date:   Unspecified ; Created By:   Annetta Bradshaw; Reaction Status:   Active ; Category:   Environment ; Substance:   Dust ; Type:   Allergy ; Updated By:   Annetta Bradshaw; Reviewed Date:   3/28/2019 5:43 PM CDT      hydroCHLOROthiazide  Estimated Onset Date:   Unspecified ; Reactions:   Hyponatremia ; Created By:   David Navarro MD; Reaction Status:   Active ; Category:   Drug ; Substance:   hydroCHLOROthiazide ; Type:    Allergy ; Updated By:   David Navarro MD; Reviewed Date:   3/28/2019 5:43 PM CDT      Mold  Estimated Onset Date:   Unspecified ; Created By:   Annetta Bradshaw; Reaction Status:   Active ; Category:   Environment ; Substance:   Mold ; Type:   Allergy ; Updated By:   Annetta Bradshaw; Reviewed Date:   3/28/2019 5:43 PM CDT      penicillins  Estimated Onset Date:   Unspecified ; Created By:   Genesis Verma MA; Reaction Status:   Active ; Category:   Drug ; Substance:   penicillins ; Type:   Allergy ; Updated By:   Genesis Verma MA; Reviewed Date:   3/28/2019 5:43 PM CDT        Medication List   (As Of: 3/28/2019 5:45:21 PM CDT)   Prescription/Discharge Order    albuterol  :   albuterol ; Status:   Prescribed ; Ordered As Mnemonic:   ProAir HFA 90 mcg/inh inhalation aerosol ; Simple Display Line:   2 puff(s), Inhale, qid, PRN: as needed for wheezing, 3 EA, 3 Refill(s) ; Ordering Provider:   David Navarro MD; Catalog Code:   albuterol ; Order Dt/Tm:   11/12/2018 8:46:06 AM          allopurinol  :   allopurinol ; Status:   Prescribed ; Ordered As Mnemonic:   allopurinol 300 mg oral tablet ; Simple Display Line:   300 mg, 1 tab(s), Oral, daily, 90 tab(s), 3 Refill(s) ; Ordering Provider:   David Navarro MD; Catalog Code:   allopurinol ; Order Dt/Tm:   11/12/2018 8:45:49 AM          amLODIPine  :   amLODIPine ; Status:   Prescribed ; Ordered As Mnemonic:   amLODIPine 5 mg oral tablet ; Simple Display Line:   5 mg, 1 tab(s), PO, Daily, 90 tab(s), 3 Refill(s) ; Ordering Provider:   David Navarro MD; Catalog Code:   amLODIPine ; Order Dt/Tm:   2/8/2019 1:36:57 PM          irbesartan  :   irbesartan ; Status:   Prescribed ; Ordered As Mnemonic:   irbesartan 150 mg oral tablet ; Simple Display Line:   1 tab(s), Oral, daily, 90 tab(s), 3 Refill(s) ; Ordering Provider:   David Navarro MD; Catalog Code:   irbesartan ; Order Dt/Tm:   2/8/2019 1:37:13 PM

## 2022-02-16 NOTE — PROGRESS NOTES
Patient:   LEONEL BUTLER            MRN: [id number]            FIN: [id number]               Age:   48 years     Sex:  Male     :  1970   Associated Diagnoses:   Active asthma; Gout; HTN, goal below 130/80; Immunization due; Normochromic anemia; Obesity; Varicose veins of legs   Author:   David Navarro MD      Visit Information   Visit type:  New patient evaluation.    Accompanied by:  No one.    Source of history:  Self.    Referral source:  Self.    History limitation:  None.       Chief Complaint   2018 8:24 AM CST   get established      History of Present Illness   The patient is a 48-year-old new patient.    He just finished the [employer], and he is assigned as a  locally.      He was morbidly-obese; at one point he weighed nearly 400 pounds.    He has lost a significant amount of weight with the keto diet.      He had an episode of what sounds like hyponatremia which was severe due to  thiazide.  He required hospitalization.    He has chronic asthma.  He does not use his inhaler very frequently.    He has varicose veins.      He watches his diet carefully.    Generally he is feeling well.            Review of Systems   Weight loss.    High blood pressure.  Nocturia once per night.  The review is otherwise negative.  History of pericardial window for a viral pericarditis.    He had sleep apnea when he was obese but he no longer does.  No snoring. No daytime sleepiness.            Health Status   Allergies:    Allergic Reactions (Selected)  Severity Not Documented  HydroCHLOROthiazide (Hyponatremia)  Penicillins (No reactions were documented)   Medications:  (Selected)   Prescriptions  Prescribed  ProAir HFA 90 mcg/inh inhalation aerosol: 2 puff(s), Inhale, qid, PRN: as needed for wheezing, # 3 EA, 3 Refill(s), Type: Maintenance, Pharmacy: [id number], 2 puff(s) Inhale qid,PRN:as needed for wheezing  allopurinol 300 mg oral tablet: = 1 tab(s) ( 300 mg ), Oral, daily, # 90  tab(s), 3 Refill(s), Type: Maintenance, Pharmacy: [facility], 1 tab(s) Oral daily  amLODIPine 5 mg oral tablet: = 1 tab(s) ( 5 mg ), Oral, daily, # 90 tab(s), 3 Refill(s), Type: Maintenance, Pharmacy: [facility], 1 tab(s) Oral daily   Problem list:    All Problems (Selected)  Active asthma / SNOMED CT 088235032 / Confirmed  Gout / SNOMED CT 949466388 / Confirmed  HTN, goal below 130/80 / SNOMED CT 8999356408 / Confirmed  Normochromic anemia / SNOMED CT 62800112 / Confirmed  Obesity / SNOMED CT 2840220415 / Probable  Varicose veins of legs / SNOMED CT 000933376 / Confirmed      Histories   Past Medical History:    Resolved  Viral pericarditis (894629031): Onset in 2008 at 37 years.  Resolved in 2008 at 37 years.   Family History:    Gout  Father  Hypertension  Mother  Father  Epilepsy  Mother     Procedure history:    Creation of pericardial window (90738191) in 2006 at 36 Years.   Social History:        Alcohol Assessment            Current                     Comments:                      11/12/2018 - David Navarro MD                     Few per week      Tobacco Assessment            Never (less than 100 in lifetime)      Employment and Education Assessment            Employed                     Comments:                      11/12/2018 - David Navarro MD      Home and Environment Assessment            Marital status: .        Physical Examination   Vital Signs   11/12/2018 8:24 AM CST Peripheral Pulse Rate 69 bpm    Systolic Blood Pressure 143 mmHg  HI    Diastolic Blood Pressure 85 mmHg  HI    Mean Arterial Pressure 104 mmHg    BP Site Right arm    BP Method Electronic      Measurements from flowsheet : Measurements   11/12/2018 8:24 AM CST Height Measured - Standard 67.25 in    Weight Measured - Standard 250 lb    BSA 2.32 m2    Body Mass Index 38.86 kg/m2  HI      General:  Alert and oriented, No acute distress.    Eye:  Normal  conjunctiva.    Airway:       Mallampati classification: I (soft palate, fauces, uvula, pillars visible).    HENT:  Normocephalic, Normal hearing, Oral mucosa is moist, No pharyngeal erythema.    Neck:  Supple, Non-tender, No carotid bruit, No jugular venous distention, No lymphadenopathy, No thyromegaly.    Respiratory:  Lungs are clear to auscultation, Respirations are non-labored.    Cardiovascular:  Normal rate, Regular rhythm, No murmur, No gallop, Good pulses equal in all extremities, Normal peripheral perfusion, No edema.    Gastrointestinal:  Soft, Non-tender, Non-distended, No organomegaly.    Musculoskeletal:  No deformity, Normal gait.    Integumentary:  No pallor.    Neurologic:  No focal deficits.    Cognition and Speech:  Speech clear and coherent, Functional cognition intact.    Psychiatric:  Cooperative, Appropriate mood & affect.       Impression and Plan   Diagnosis     Active asthma (OZA04-HL J45.909).     Gout (HCY17-ZH M10.9).     HTN, goal below 130/80 (CCZ94-VW I10).     Immunization due (DZH25-VO Z23).     Normochromic anemia (AUW72-GK D64.9).     Obesity (RXA44-SC E66.9).     Varicose veins of legs (FON43-IL I83.93).     Patient Instructions:       Counseled: Patient, Regarding medications, Diet, Activity, Verbalized understanding.    Summary:  BP above goal.    Orders     Orders (Selected)   Outpatient Orders  Ordered  Return to Clinic (Request): RFV: BP check with CSS  Return to Clinic (Request): RFV: Lipids, CBC, lipids, PSA. BMP, Uric acid, Return in Feb 2019  Completed  Pneumovax 23: 0.5 mL, im, once  Order  Return to Clinic (Request): Return in 6 months  Prescriptions  Prescribed  ProAir HFA 90 mcg/inh inhalation aerosol: 2 puff(s), Inhale, qid, PRN: as needed for wheezing, # 3 EA, 3 Refill(s), Type: Maintenance, Pharmacy: [facility], 2 puff(s) Inhale qid,PRN:as needed for wheezing  allopurinol 300 mg oral tablet: = 1 tab(s) ( 300 mg ), Oral, daily, # 90 tab(s), 3  Refill(s), Type: Maintenance, Pharmacy: [facility], 1 tab(s) Oral daily  amLODIPine 5 mg oral tablet: = 1 tab(s) ( 5 mg ), Oral, daily, # 90 tab(s), 3 Refill(s), Type: Maintenance, Pharmacy: [facility] [address], 1 tab(s) Oral daily.     Home BP monitoring.

## 2022-02-16 NOTE — PROGRESS NOTES
Chief Complaint    Pt here for a HTN recheck and medication refills.  History of Present Illness      Patient is here for refill his medications.  He has been well.  He is congratulated on his over 20 pound weight loss since last visit and over 100 pound weight loss overall.  He is doing a pool-based exercise program and controlling carbs.  He has had success with his program in the past.  His knees are doing much better.  He is looking forward to his trip to [place] beginning of next year.  He has not had gout problems for quite some time and is interested in getting off allopurinol.  Review of Systems      No fatigue, other joint complaints, chest pain, dyspnea, edema, abdominal pain or GI complaints.  No headache cough or myalgia.  Asthma well controlled.  Rarely needs to use albuterol.  Physical Exam   Vitals & Measurements    T: 96   F (Tympanic)  HR: 64(Peripheral)  RR: 16  BP: 104/60  SpO2: 97%     HT: 67.25 in  WT: 226 lb  BMI: 35.13       Patient appears comfortable and in no distress.  Alert and oriented.  Eyes appear normal.  HEENT exam is unremarkable.  Neck is thick but without adenopathy or thyromegaly.  Clear.  Cardiac exam is regular without murmur.  No edema.  Assessment/Plan       Chronic kidney disease stage 3 (N18.3)        Lab work in 2 months.          Ordered:          60353 office outpatient visit 25 minutes (Charge), Quantity: 1, Chronic kidney disease stage 3  Normocytic normochromic anemia  HTN, goal below 130/80  Osteoarthritis  [place] cardiac risk <10% in next 10 years  Gout                [place] cardiac risk <10% in next 10 years (Z91.89)         Repeat lipids in 2 months.         Ordered:          65905 office outpatient visit 25 minutes (Charge), Quantity: 1, Chronic kidney disease stage 3  Normocytic normochromic anemia  HTN, goal below 130/80  Osteoarthritis  [place] cardiac risk <10% in next 10 years  Gout                Gout (M10.9)         Taper off  allopurinol.  Uric acid in 2 months.         Ordered:          16446 office outpatient visit 25 minutes (Charge), Quantity: 1, Chronic kidney disease stage 3  Normocytic normochromic anemia  HTN, goal below 130/80  Osteoarthritis  [place] cardiac risk <10% in next 10 years  Gout                HTN, goal below 130/80 (I10)         Controlled.         Ordered:          07393 office outpatient visit 25 minutes (Charge), Quantity: 1, Chronic kidney disease stage 3  Normocytic normochromic anemia  HTN, goal below 130/80  Osteoarthritis  [place] cardiac risk <10% in next 10 years  Gout                Normocytic normochromic anemia (D64.9)         Repeat CBC in 2 months.         Ordered:          25837 office outpatient visit 25 minutes (Charge), Quantity: 1, Chronic kidney disease stage 3  Normocytic normochromic anemia  HTN, goal below 130/80  Osteoarthritis  [place] cardiac risk <10% in next 10 years  Gout                Obesity (Probable) (E66.9)        Patient is congratulated on his weight loss and encouraged to lose another 20 to 50 pounds.                 Osteoarthritis (M19.90)         Symptoms of knee arthritis improved with weight loss.         Ordered:          16273 office outpatient visit 25 minutes (Charge), Quantity: 1, Chronic kidney disease stage 3  Normocytic normochromic anemia  HTN, goal below 130/80  Osteoarthritis  [place] cardiac risk <10% in next 10 years  Gout                Orders:         albuterol, 2 puff(s), NEB, qid, PRN: AS NEEDED FOR WHEEZING, # 3 EA, 3 Refill(s), Type: Maintenance, Pharmacy: [facility] #[id number], Needs appt for further refills, 2 puff(s) NEB qid,PRN:AS NEEDED FOR WHEEZING, (Ordered)         allopurinol, See Instructions, Instructions: 3 daily for one month, then 2 daily for one month, then 1 daily for one month, then discontinue., # 180 EA, 0 Refill(s), Type: Maintenance, Pharmacy: [facility] #[id number], 3 daily for one month,  then 2 daily for..., (Ordered)         amLODIPine, = 1 tab(s) ( 5 mg ), PO, Daily, # 90 tab(s), 3 Refill(s), Type: Maintenance, Pharmacy: [facility] #[id number], 1 tab(s) Oral daily, (Ordered)         fluticasone-salmeterol, 1 puff(s), INH, BID, # 180 EA, 3 Refill(s), Type: Maintenance, Pharmacy: [facility] #[id number], 1 puff(s) Inhale bid, (Ordered)         irbesartan, = 1 tab(s), Oral, daily, # 90 tab(s), 3 Refill(s), YOBANY, Type: Maintenance, [facility]acy: [facility] #[id number], due for visit, 1 tab(s) Oral daily, (Ordered)         Return to Clinic (Request), RFV: CBC, Return in 3 months         Return to Clinic (Request), Return in 2 months         Return to Clinic (Request), RFV: Lipids, CBC, lipids, PSA. BMP, Uric acid, Return in February 2020  Patient Information     Name:LEONEL BUTLER      Address:      [address] [place] [id number]     Sex:Male     YOB: 1970     [phone]     Emergency Contact:JUANCARLOS BUTLER     MRN:[id number]     FIN:[id number]     Location:[facility]     Date of Service:12/23/2019      Primary Care Physician:       David Navarro MD, [phone]      Attending Physician:       David Navarro MD, [phone]  Problem List/Past Medical History    Ongoing     Active asthma     Chronic kidney disease stage 3     [place] cardiac risk <10% in next 10 years       Comments: 10 yeaar risk 3.5%     Gout     HTN, goal below 130/80     Normocytic normochromic anemia     Obesity     Osteoarthritis     Varicose veins of legs    Historical     Normochromic anemia     Viral pericarditis  Procedure/Surgical History     Creation of pericardial window (2006)  Medications    Advair Diskus 250 mcg-50 mcg inhalation powder, 1 puff(s), Inhale, bid, 3 refills    allopurinol 100 mg oral tablet, See Instructions    amLODIPine 5 mg oral tablet, 5 mg= 1 tab(s), Oral, daily, 3 refills    irbesartan 150 mg oral  tablet, 1 tab(s), Oral, daily, 3 refills    ProAir HFA 90 mcg/inh inhalation aerosol, 2 puff(s), NEB, qid, PRN, 3 refills  Allergies    Animal Dander    Dust    Mold    hydroCHLOROthiazide (Hyponatremia)    penicillins  Social History    Smoking Status - 12/23/2019     Never smoker     Alcohol      Current, 11/12/2018     Employment/School      Employed, 11/12/2018     Home/Environment      Marital status: ., 11/12/2018     Tobacco      Never (less than 100 in lifetime), 11/12/2018  Family History    CA - Cancer of prostate: Father.    Cancer of cervix: Mother.    Epilepsy: Mother.    Gout: Father.    Hypertension: Mother and Father.  Immunizations      Vaccine Date Status          influenza virus vaccine, inactivated 09/26/2019 Recorded          pneumococcal (PPSV23) 11/12/2018 Given          influenza virus vaccine, inactivated 11/05/2018 Recorded          influenza virus vaccine, inactivated 09/20/2017 Recorded          influenza virus vaccine, inactivated 11/11/2016 Recorded          Hep B 10/24/2014 Recorded          Hep B 09/14/2014 Recorded          varicella 01/31/2014 Recorded          varicella 12/18/2013 Recorded          tetanus/diphth/pertuss (Tdap) adult/adol 11/21/2013 Recorded          MMR (measles/mumps/rubella) 11/21/2013 Recorded          MMR (measles/mumps/rubella) 08/03/2011 Recorded          mumps 04/07/1973 Recorded          rubella 08/10/1971 Recorded          rubella 08/10/1971 Recorded          measles 08/10/1971 Recorded          IPV 02/20/1971 Recorded          IPV 1970 Recorded          DTaP 1970 Recorded          DTaP 1970 Recorded          DTaP 1970 Recorded

## 2022-02-16 NOTE — NURSING NOTE
Quick Intake Entered On:  3/29/2019 10:44 AM CDT    Performed On:  3/29/2019 10:43 AM CDT by David Navarro MD               Summary   Systolic Blood Pressure :   142 mmHg (HI)    Diastolic Blood Pressure :   84 mmHg (HI)    Mean Arterial Pressure :   103 mmHg   BP Site :   Right arm   BP Method :   Manual   David Navarro MD - 3/29/2019 10:43 AM CDT

## 2022-02-16 NOTE — LETTER
(Inserted Image. Unable to display)     July 15, 2019      LEONEL BUTLER  [address] [facility] [address]  [place], [place] [id number]          Dear LEONEL,      Thank you for selecting [facility] (previously [facility]) for your healthcare needs.    Our records indicate you are due for the following services:    Hypertension check ~ please remember to bring your at-home blood pressure readings with you to your appointment.     Non-Fasting Labs.    If you had your labs done at another facility or with Direct Access Lab Testing at [facility], please bring in a copy of the results to your next visit, mail a copy, or drop off a copy of your results to your Healthcare Provider.    You are due for lab work and an office visit; please schedule the lab appointment 1 week before the office visit.  This will assure all results are available to discuss with your provider during your visit.    **It is very helpful if you bring your medication bottles to your appointment.  This assures we have all of your current medications, including strength and dosing information, documented accurately in your medical record.    To schedule an appointment or if you have further questions, please contact your primary clinic:   [facility]       [phone]   [facility]       [phone]              [facility]     [phone]      Powered by [employer] and [employer]    Sincerely,    David Navarro MD, FACP

## 2022-02-16 NOTE — TELEPHONE ENCOUNTER
Entered by Rosa Ferris CMA on January 31, 2019 2:54:23 PM CST  ---------------------  From: Rosa Ferris CMA   To: [employer] [id number]    Sent: 1/31/2019 2:54:23 PM CST  Subject: Medication Management     ** Submitted: **  Order:irbesartan (irbesartan 150 mg oral tablet)  1 tab(s)  Oral  daily  Qty:  30 tab(s)        Days Supply:  30        Refills:  0          YOBANY     Route To [facility]  [employer] [id number]    Signed by Rosa Ferris CMA  1/31/2019 2:54:00 PM    ** Submitted: **  Complete:irbesartan (irbesartan 150 mg oral tablet)   Signed by Rosa Ferris CMA  1/31/2019 2:54:00 PM    ** Not Approved:  **  irbesartan (IRBESARTAN 150MG TABLETS)  TAKE 1 TABLET BY MOUTH DAILY  Qty:  30 tab(s)        Days Supply:  30        Refills:  0          YOBANY     Route To [facility] [employer] [id number]   Signed by Rosa Ferris CMA            ------------------------------------------  From: [employer] [id number]  To: David Navarro MD  Sent: January 31, 2019 1:05:39 PM CST  Subject: Medication Management  Due: February 1, 2019 1:05:39 PM CST    ** On Hold Pending Signature **  Drug: irbesartan (irbesartan 150 mg oral tablet)  1 TAB(S) ORAL DAILY  Quantity: 30 tab(s)     Days Supply: 0         Refills: 0  Substitutions Allowed  Notes from Pharmacy:     Dispensed Drug: irbesartan (irbesartan 150 mg oral tablet)  TAKE 1 TABLET BY MOUTH DAILY  Quantity: 30 tab(s)     Days Supply: 30        Refills: 0  Substitutions Allowed  Notes from Pharmacy:   ------------------------------------------Due for HTN check w/ JDL-in recall now.

## 2022-02-16 NOTE — TELEPHONE ENCOUNTER
---------------------  From: Luna Chaudhari CMA ([facility] ([address]))   To: eRx Pool ([address]_[place] [facility] [place]);     Sent: 3/17/2020 3:54:44 PM CDT  Subject: allopurinol request     Fax received from [employer]  for allopurinol. Per last visit, pt and JDL discussed tapering off of this medication. LM at 1550 for pt to c/b to discuss if he feels like he still needs to take it.     No recent uric acid level over last year - there is a RTC to have drawn along with other labs last month but pt never presented.

## 2022-02-16 NOTE — NURSING NOTE
Comprehensive Intake Entered On:  3/29/2019 10:32 AM CDT    Performed On:  3/29/2019 10:31 AM CDT by Genesis Verma MA               Summary   Chief Complaint :   follow up - still labored breathing   Weight Measured :   253 lb(Converted to: 253 lb 0 oz, 114.76 kg)    Height Measured :   67.25 in(Converted to: 5 ft 7 in, 170.81 cm)    Body Mass Index :   39.33 kg/m2 (HI)    Body Surface Area :   2.33 m2   Systolic Blood Pressure :   147 mmHg (HI)    Diastolic Blood Pressure :   90 mmHg (HI)    Mean Arterial Pressure :   109 mmHg   Peripheral Pulse Rate :   77 bpm   BP Site :   Right arm   Genesis Verma MA - 3/29/2019 10:31 AM CDT   Meds / Allergies   (As Of: 3/29/2019 10:32:58 AM CDT)   Allergies (Active)   Animal Dander  Estimated Onset Date:   Unspecified ; Comments:     Comment 1: cat hair   ; Created By:   Annetta Bradshaw; Reaction Status:   Active ; Category:   Other ; Substance:   Animal Dander ; Type:   Allergy ; Updated By:   Annetta Bradshaw; Reviewed Date:   3/28/2019 5:43 PM CDT      Dust  Estimated Onset Date:   Unspecified ; Created By:   Annetta Bradshaw; Reaction Status:   Active ; Category:   Environment ; Substance:   Dust ; Type:   Allergy ; Updated By:   Annetta Bradshaw; Reviewed Date:   3/28/2019 5:43 PM CDT      hydroCHLOROthiazide  Estimated Onset Date:   Unspecified ; Reactions:   Hyponatremia ; Created By:   David Navarro MD; Reaction Status:   Active ; Category:   Drug ; Substance:   hydroCHLOROthiazide ; Type:   Allergy ; Updated By:   David Navarro MD; Reviewed Date:   3/28/2019 5:43 PM CDT      Mold  Estimated Onset Date:   Unspecified ; Created By:   Annetta Bradshaw; Reaction Status:   Active ; Category:   Environment ; Substance:   Mold ; Type:   Allergy ; Updated By:   Annetta Bradshaw; Reviewed Date:   3/28/2019 5:43 PM CDT      penicillins  Estimated Onset Date:   Unspecified ; Created By:   Genesis Verma MA; Reaction Status:   Active ; Category:   Drug ; Substance:   penicillins ; Type:    Allergy ; Updated By:   Genesis Verma MA; Reviewed Date:   3/28/2019 5:43 PM CDT        Medication List   (As Of: 3/29/2019 10:32:58 AM CDT)   Prescription/Discharge Order    albuterol  :   albuterol ; Status:   Prescribed ; Ordered As Mnemonic:   ProAir HFA 90 mcg/inh inhalation aerosol ; Simple Display Line:   2 puff(s), Inhale, qid, PRN: as needed for wheezing, 3 EA, 3 Refill(s) ; Ordering Provider:   David Navarro MD; Catalog Code:   albuterol ; Order Dt/Tm:   11/12/2018 8:46:06 AM          allopurinol  :   allopurinol ; Status:   Prescribed ; Ordered As Mnemonic:   allopurinol 300 mg oral tablet ; Simple Display Line:   300 mg, 1 tab(s), Oral, daily, 90 tab(s), 3 Refill(s) ; Ordering Provider:   David Navarro MD; Catalog Code:   allopurinol ; Order Dt/Tm:   11/12/2018 8:45:49 AM          amLODIPine  :   amLODIPine ; Status:   Prescribed ; Ordered As Mnemonic:   amLODIPine 5 mg oral tablet ; Simple Display Line:   5 mg, 1 tab(s), PO, Daily, 90 tab(s), 3 Refill(s) ; Ordering Provider:   David Navarro MD; Catalog Code:   amLODIPine ; Order Dt/Tm:   2/8/2019 1:36:57 PM          fluticasone-salmeterol  :   fluticasone-salmeterol ; Status:   Prescribed ; Ordered As Mnemonic:   Advair Diskus 250 mcg-50 mcg inhalation powder ; Simple Display Line:   1 puff(s), INH, BID, 60 EA, 11 Refill(s) ; Ordering Provider:   David Navarro MD; Catalog Code:   fluticasone-salmeterol ; Order Dt/Tm:   3/28/2019 7:10:54 PM          irbesartan  :   irbesartan ; Status:   Prescribed ; Ordered As Mnemonic:   irbesartan 150 mg oral tablet ; Simple Display Line:   1 tab(s), Oral, daily, 90 tab(s), 3 Refill(s) ; Ordering Provider:   David Navarro MD; Catalog Code:   irbesartan ; Order Dt/Tm:   2/8/2019 1:37:13 PM            Home Meds    predniSONE  :   predniSONE ; Status:   Documented ; Ordered As Mnemonic:   predniSONE 20 mg oral tablet ; Simple Display Line:   60 mg, 3 tab(s), Oral, daily, for 5 day(s), 0 Refill(s) ; Catalog Code:    predniSONE ; Order Dt/Tm:   3/28/2019 5:52:30 PM

## 2022-02-16 NOTE — NURSING NOTE
Comprehensive Intake Entered On:  12/23/2019 9:20 AM CST    Performed On:  12/23/2019 9:15 AM CST by Kamaljit Alvarado CMA               Summary   Chief Complaint :   Pt here for a HTN recheck and medication refills.   Weight Measured :   226 lb(Converted to: 226 lb 0 oz, 102.51 kg)    Height Measured :   67.25 in(Converted to: 5 ft 7 in, 170.81 cm)    Body Mass Index :   35.13 kg/m2 (HI)    Body Surface Area :   2.2 m2   Systolic Blood Pressure :   104 mmHg   Diastolic Blood Pressure :   60 mmHg   Mean Arterial Pressure :   75 mmHg   Peripheral Pulse Rate :   64 bpm   BP Site :   Right arm   Pulse Site :   Radial artery   Temperature Tympanic :   96 DegF(Converted to: 35.6 DegC)  (LOW)    Respiratory Rate :   16 br/min   Oxygen Saturation :   97 %   Kamaljit Alvarado CMA - 12/23/2019 9:15 AM CST   Health Status   Allergies Verified? :   Yes   Medication History Verified? :   Yes   Medical History Verified? :   Yes   Pre-Visit Planning Status :   Not completed   Tobacco Use? :   Never smoker   Kamaljit Alvarado CMA - 12/23/2019 9:15 AM CST   Meds / Allergies   (As Of: 12/23/2019 9:20:02 AM CST)   Allergies (Active)   Animal Dander  Estimated Onset Date:   Unspecified ; Comments:     Comment 1: cat hair   ; Created By:   Annetta Bradshaw; Reaction Status:   Active ; Category:   Other ; Substance:   Animal Dander ; Type:   Allergy ; Updated By:   Annetta Bradshaw; Reviewed Date:   12/23/2019 9:17 AM CST      Dust  Estimated Onset Date:   Unspecified ; Created By:   Annetta Bradshaw; Reaction Status:   Active ; Category:   Environment ; Substance:   Dust ; Type:   Allergy ; Updated By:   Annetta Bradshaw; Reviewed Date:   12/23/2019 9:17 AM CST      hydroCHLOROthiazide  Estimated Onset Date:   Unspecified ; Reactions:   Hyponatremia ; Created By:   David Navarro MD; Reaction Status:   Active ; Category:   Drug ; Substance:   hydroCHLOROthiazide ; Type:   Allergy ; Updated By:   David Navarro MD; Reviewed Date:   12/23/2019 9:17 AM  CST      Mold  Estimated Onset Date:   Unspecified ; Created By:   Annetta Bradshaw; Reaction Status:   Active ; Category:   Environment ; Substance:   Mold ; Type:   Allergy ; Updated By:   Annetta Bradshaw; Reviewed Date:   12/23/2019 9:17 AM CST      penicillins  Estimated Onset Date:   Unspecified ; Created By:   Genesis Verma MA; Reaction Status:   Active ; Category:   Drug ; Substance:   penicillins ; Type:   Allergy ; Updated By:   Genesis Verma MA; Reviewed Date:   12/23/2019 9:17 AM CST        Medication List   (As Of: 12/23/2019 9:20:02 AM CST)   Prescription/Discharge Order    albuterol  :   albuterol ; Status:   Prescribed ; Ordered As Mnemonic:   ProAir HFA 90 mcg/inh inhalation aerosol ; Simple Display Line:   2 puff(s), NEB, qid, PRN: AS NEEDED FOR WHEEZING, 1 EA, 0 Refill(s) ; Ordering Provider:   David Navarro MD; Catalog Code:   albuterol ; Order Dt/Tm:   10/23/2019 1:58:52 PM CDT          fluticasone-salmeterol  :   fluticasone-salmeterol ; Status:   Prescribed ; Ordered As Mnemonic:   Advair Diskus 250 mcg-50 mcg inhalation powder ; Simple Display Line:   1 puff(s), INH, BID, 180 EA, 3 Refill(s) ; Ordering Provider:   Guillermo Slater MD; Catalog Code:   fluticasone-salmeterol ; Order Dt/Tm:   5/7/2019 1:57:45 PM CDT          irbesartan  :   irbesartan ; Status:   Prescribed ; Ordered As Mnemonic:   irbesartan 150 mg oral tablet ; Simple Display Line:   1 tab(s), Oral, daily, 90 tab(s), 3 Refill(s) ; Ordering Provider:   David Navarro MD; Catalog Code:   irbesartan ; Order Dt/Tm:   2/8/2019 1:37:13 PM CST          amLODIPine  :   amLODIPine ; Status:   Prescribed ; Ordered As Mnemonic:   amLODIPine 5 mg oral tablet ; Simple Display Line:   5 mg, 1 tab(s), PO, Daily, 90 tab(s), 3 Refill(s) ; Ordering Provider:   David Navarro MD; Catalog Code:   amLODIPine ; Order Dt/Tm:   2/8/2019 1:36:57 PM CST          allopurinol  :   allopurinol ; Status:   Prescribed ; Ordered As Mnemonic:   allopurinol 300 mg  oral tablet ; Simple Display Line:   300 mg, 1 tab(s), Oral, daily, 90 tab(s), 3 Refill(s) ; Ordering Provider:   David Navarro MD; Catalog Code:   allopurinol ; Order Dt/Tm:   11/12/2018 8:45:49 AM CST            Social History   Social History   (As Of: 12/23/2019 9:20:02 AM CST)   Alcohol:        Current   Comments:  11/12/2018 8:48 AM - David Navarro MD: Few per week   (Last Updated: 11/12/2018 8:48:52 AM CST by David Navarro MD)          Tobacco:        Never (less than 100 in lifetime)   (Last Updated: 11/12/2018 8:48:52 AM CST by David Navarro MD)          Employment/School:        Employed   Comments:  11/12/2018 8:48 AM - David Navarro MD:    (Last Updated: 11/12/2018 8:48:52 AM CST by David Navarro MD)          Home/Environment:        Marital status: .   (Last Updated: 11/12/2018 8:48:52 AM CST by David Navarro MD)

## 2022-02-16 NOTE — LETTER
(Inserted Image. Unable to display)   July 02, 2019        LEONEL BUTLER  [address] [facility] [address]  [place], [place] [id number]        Dear LEONEL,      Thank you for selecting [facility] (previously [facility]) for your healthcare needs.    Our records indicate you are due for the following services:     Non-Fasting Labs    If you had your labs done at another facility or with Direct Access Lab Testing at [facility], please bring in a copy of the results to your next visit, mail a copy, or drop off a copy of your results to your Healthcare Provider.    To schedule an appointment or if you have further questions, please contact your primary clinic:   [facility]       [phone]   [facility]       [phone]              [facility]     [phone]      Powered by [employer]    Sincerely,    David Navarro M.D., FACP

## 2022-02-16 NOTE — PROGRESS NOTES
Chief Complaint    f/u lab results/HTN med check, brought at home bp readings with him  History of Present Illness      Patient brings blood pressure readings from home which show excellent control of hypertension.  No recent gout.  We reviewed his lipid levels and estimated ten-year risk is 3.5%.  Review of Systems      No headache, chest pain, dyspnea, edema, claudication.  He continues to work on his weight.  He is trying to increase his physical activity.  Physical Exam   Vitals & Measurements    T: 98.4   F (Tympanic)  HR: 76(Peripheral)  BP: 126/68     HT: 67.25 in  WT: 253.5 lb  BMI: 39.4       Patient is obese young man in no distress.  Alert and oriented.  Chest clear.  Cardiac exam regular.  Extremities have no edema.  Assessment/Plan       Active asthma (J45.909)       Bristol cardiac risk <10% in next 10 years (Z91.89)         We discussed risk reduction.  Statin and aspirin not indicated at this time.       Gout (M10.9)         No recent gout attacks.       HTN, goal below 130/80 (I10)         Controlled.       Normochromic anemia (D64.9)         Resolved.  Appears to have been due to an acute illness with kidney dysfunction.       Obesity (E66.9)         Encouraged weight loss.  Patient Information     Name:LEONEL BUTLER      Address:      [address] [place] [id number]     Sex:Male     YOB: 1970     [phone]     Emergency Contact:JUANCARLOS BUTLER     MRN:[id number]     FIN:[id number]     Location:[facility]     Date of Service:02/08/2019      Primary Care Physician:       NONE ,       Attending Physician:       David Navarro MD, [phone]  Problem List/Past Medical History    Ongoing     Active asthma     Chronic kidney disease stage 3     Bristol cardiac risk <10% in next 10 years       Comments: 10 yeaar risk 3.5%     Gout     HTN, goal below 130/80     Obesity     Osteoarthritis     Varicose veins of  legs    Historical     Normochromic anemia     Viral pericarditis  Procedure/Surgical History     Creation of pericardial window (2006)  Medications        allopurinol 300 mg oral tablet: 300 mg, 1 tab(s), Oral, daily, 90 tab(s), 3 Refill(s).        ProAir HFA 90 mcg/inh inhalation aerosol: 2 puff(s), Inhale, qid, PRN: as needed for wheezing, 3 EA, 3 Refill(s).        irbesartan 150 mg oral tablet: 1 tab(s), Oral, daily, 90 tab(s), 3 Refill(s).        amLODIPine 5 mg oral tablet: 5 mg, 1 tab(s), PO, Daily, 90 tab(s), 3 Refill(s).         Allergies    Animal Dander    Dust    Mold    hydroCHLOROthiazide (Hyponatremia)    penicillins  Social History    Smoking Status - 02/08/2019     Never smoker     Alcohol      Current, 11/12/2018     Employment and Education      Employed, 11/12/2018     Home and Environment      Marital status: ., 11/12/2018     Tobacco      Never (less than 100 in lifetime), 11/12/2018  Family History    CA - Cancer of prostate: Father.    Cancer of cervix: Mother.    Epilepsy: Mother.    Gout: Father.    Hypertension: Mother and Father.  Immunizations      Vaccine Date Status      pneumococcal (PPSV23) 11/12/2018 Given      influenza virus vaccine, inactivated 11/05/2018 Recorded      influenza virus vaccine, inactivated 09/20/2017 Recorded      influenza virus vaccine, inactivated 11/11/2016 Recorded      Hep B 10/24/2014 Recorded      Hep B 09/14/2014 Recorded      varicella 01/31/2014 Recorded      varicella 12/18/2013 Recorded      tetanus/diphth/pertuss (Tdap) adult/adol 11/21/2013 Recorded      MMR (measles/mumps/rubella) 11/21/2013 Recorded      MMR (measles/mumps/rubella) 08/03/2011 Recorded      mumps 04/07/1973 Recorded      rubella 08/10/1971 Recorded      rubella 08/10/1971 Recorded      measles 08/10/1971 Recorded      IPV 02/20/1971 Recorded      IPV 1970 Recorded      DTaP 1970 Recorded      DTaP 1970 Recorded      DTaP 1970 Recorded  Lab Results           Lab Results (Last 4 results within 90 days)           Sodium Level: 137 mmol/L (02/05/19 14:09:00)          Potassium Level: 4.2 mmol/L (02/05/19 14:09:00)          Chloride Level: 101 mmol/L (02/05/19 14:09:00)          CO2 Level: 28 mmol/L (02/05/19 14:09:00)          Glucose Level: 91 mg/dL (02/05/19 14:09:00)          BUN: 36 mg/dL High (02/05/19 14:09:00)          Creatinine Level: 1.18 mg/dL (02/05/19 14:09:00)          BUN/Creat Ratio: 31 High (02/05/19 14:09:00)          eGFR: 73 mL/min/1.73m2 (02/05/19 14:09:00)          eGFR African American: 84 mL/min/1.73m2 (02/05/19 14:09:00)          Calcium Level: 10.3 mg/dL (02/05/19 14:09:00)          Uric Acid: 5.9 mg/dL (02/05/19 14:09:00)          Cholesterol: 230 mg/dL High (02/05/19 14:09:00)          Non-HDL Cholesterol: 174 High (02/05/19 14:09:00)          HDL: 56 mg/dL (02/05/19 14:09:00)          Cholesterol/HDL Ratio: 4.1 (02/05/19 14:09:00)          LDL: 149 High (02/05/19 14:09:00)          Triglyceride: 123 mg/dL (02/05/19 14:09:00)          PSA: 2.6 ng/mL (02/05/19 14:09:00)          WBC: 6.7 (02/05/19 14:09:00)          RBC: 4.65 (02/05/19 14:09:00)          Hgb: 14.5 gm/dL (02/05/19 14:09:00)          Hct: 42.2 % (02/05/19 14:09:00)          MCV: 90.8 fL (02/05/19 14:09:00)          MCH: 31.2 pg (02/05/19 14:09:00)          MCHC: 34.4 gm/dL (02/05/19 14:09:00)          RDW: 12.8 % (02/05/19 14:09:00)          Platelet: 209 (02/05/19 14:09:00)          MPV: 9.6 fL (02/05/19 14:09:00)

## 2022-02-16 NOTE — TELEPHONE ENCOUNTER
---------------------  From: Ita Marquis CMA (Phone Messages Pool ([address] [place] [address]))   To: [facility] Message Pool ([address] [place] [address]);     Sent: 3/26/2019 12:08:05 PM CDT  Subject: General Message- increased SOB     Phone Message    PCP:   VIVIANA      Time of Call:  1148       Person Calling:  self  Phone number:  [phone], ok LM     Returned call at: 1200    Note:   pt returned Friday from 11day trip to [place].  Woke up during the night with increased SOB, wheezing.  Has been using in rescue inhaler and not doing anything.    Pt is very winded talking on the phone, he has someone coming to pick him up in [place] now.  Asking if he should come to  or go to [place] since it's closer.  Recommending [facility] and he agrees---------------------  From: Genesis Verma MA ([employer] Message Pool ([address]))   To: David Navarro MD;     Sent: 3/26/2019 12:10:55 PM CDT  Subject: FW: General Message- increased SOB     FYI---------------------  From: David Navarro MD   To: [facility] Message Pool ([address]);     Sent: 3/26/2019 12:19:41 PM CDT  Subject: RE: General Message- increased SOB     ok

## 2022-02-16 NOTE — NURSING NOTE
Comprehensive Intake Entered On:  5/14/2019 3:09 PM CDT    Performed On:  5/14/2019 2:58 PM CDT by Nettie Ramsey CMA               Summary   Chief Complaint :   Patient presents today to f/u on his labored breathing. Continues to cough regularly.   Nettie Ramsey CMA - 5/14/2019 3:10 PM CDT       Weight Measured :   249.5 lb(Converted to: 249 lb 8 oz, 113.17 kg)    Systolic Blood Pressure :   112 mmHg   Diastolic Blood Pressure :   76 mmHg   Mean Arterial Pressure :   88 mmHg   Peripheral Pulse Rate :   71 bpm   BP Site :   Right arm   BP Method :   Manual   HR Method :   Electronic   Temperature Tympanic :   97.4 DegF(Converted to: 36.3 DegC)  (LOW)    Respiratory Rate :   12 br/min (LOW)    Oxygen Saturation :   99 %   Nettie Ramsey CMA - 5/14/2019 2:58 PM CDT   Health Status   Allergies Verified? :   Yes   Medication History Verified? :   Yes   Pre-Visit Planning Status :   N/A   Tobacco Use? :   Never smoker   Nettie Ramsey CMA - 5/14/2019 2:58 PM CDT   Consents   Consent for Immunization Exchange :   Consent Granted   Consent for Immunizations to Providers :   Consent Granted   Nettie Ramsey CMA - 5/14/2019 2:58 PM CDT   Meds / Allergies   (As Of: 5/14/2019 3:09:04 PM CDT)   Allergies (Active)   Animal Dander  Estimated Onset Date:   Unspecified ; Comments:     Comment 1: cat hair   ; Created By:   Annetta Bradshaw; Reaction Status:   Active ; Category:   Other ; Substance:   Animal Dander ; Type:   Allergy ; Updated By:   Annetta Bradshaw; Reviewed Date:   3/28/2019 5:43 PM CDT      Dust  Estimated Onset Date:   Unspecified ; Created By:   Annetta Bradshaw; Reaction Status:   Active ; Category:   Environment ; Substance:   Dust ; Type:   Allergy ; Updated By:   Annetta Bradshaw; Reviewed Date:   3/28/2019 5:43 PM CDT      hydroCHLOROthiazide  Estimated Onset Date:   Unspecified ; Reactions:   Hyponatremia ; Created By:   David Navarro MD; Reaction Status:   Active ; Category:   Drug ; Substance:   hydroCHLOROthiazide ;  Type:   Allergy ; Updated By:   David Navarro MD; Reviewed Date:   3/28/2019 5:43 PM CDT      Mold  Estimated Onset Date:   Unspecified ; Created By:   Annetta Bradshaw; Reaction Status:   Active ; Category:   Environment ; Substance:   Mold ; Type:   Allergy ; Updated By:   Annetta Bradshaw; Reviewed Date:   3/28/2019 5:43 PM CDT      penicillins  Estimated Onset Date:   Unspecified ; Created By:   Genesis Verma MA; Reaction Status:   Active ; Category:   Drug ; Substance:   penicillins ; Type:   Allergy ; Updated By:   Genesis Verma MA; Reviewed Date:   3/28/2019 5:43 PM CDT        Medication List   (As Of: 5/14/2019 3:09:04 PM CDT)   Prescription/Discharge Order    albuterol  :   albuterol ; Status:   Prescribed ; Ordered As Mnemonic:   ProAir HFA 90 mcg/inh inhalation aerosol ; Simple Display Line:   2 puff(s), Inhale, qid, PRN: as needed for wheezing, 3 EA, 3 Refill(s) ; Ordering Provider:   David Navarro MD; Catalog Code:   albuterol ; Order Dt/Tm:   11/12/2018 8:46:06 AM          allopurinol  :   allopurinol ; Status:   Prescribed ; Ordered As Mnemonic:   allopurinol 300 mg oral tablet ; Simple Display Line:   300 mg, 1 tab(s), Oral, daily, 90 tab(s), 3 Refill(s) ; Ordering Provider:   David Navarro MD; Catalog Code:   allopurinol ; Order Dt/Tm:   11/12/2018 8:45:49 AM          amLODIPine  :   amLODIPine ; Status:   Prescribed ; Ordered As Mnemonic:   amLODIPine 5 mg oral tablet ; Simple Display Line:   5 mg, 1 tab(s), PO, Daily, 90 tab(s), 3 Refill(s) ; Ordering Provider:   David Navarro MD; Catalog Code:   amLODIPine ; Order Dt/Tm:   2/8/2019 1:36:57 PM          fluticasone-salmeterol  :   fluticasone-salmeterol ; Status:   Prescribed ; Ordered As Mnemonic:   Advair Diskus 250 mcg-50 mcg inhalation powder ; Simple Display Line:   1 puff(s), INH, BID, 180 EA, 3 Refill(s) ; Ordering Provider:   Guillermo Slater MD; Catalog Code:   fluticasone-salmeterol ; Order Dt/Tm:   5/7/2019 1:57:45 PM          irbesartan  :    irbesartan ; Status:   Prescribed ; Ordered As Mnemonic:   irbesartan 150 mg oral tablet ; Simple Display Line:   1 tab(s), Oral, daily, 90 tab(s), 3 Refill(s) ; Ordering Provider:   David Navarro MD; Catalog Code:   irbesartan ; Order Dt/Tm:   2/8/2019 1:37:13 PM            Home Meds    predniSONE  :   predniSONE ; Status:   Processing ; Ordered As Mnemonic:   predniSONE 20 mg oral tablet ; Action Display:   Complete ; Catalog Code:   predniSONE ; Order Dt/Tm:   5/14/2019 3:06:50 PM

## 2022-02-16 NOTE — PROGRESS NOTES
"Chief Complaint    follow up - still labored breathing  History of Present Illness      Patient is here for follow-up after leg ultrasound.  Please see the visit from yesterday for details.  Feels like the leg edema is improved overnight.  He has varicose veins worse in that right leg than the left and uses compression stockings.  Still has a vague sense of \"labored respiration\" with or without activity.  When he was seen in the ED earlier in the week he was wheezing and given his history of asthma has been treated with prednisone.  Wheezing resolved for the most part and dyspnea has improved.  No chest pain or pressure.  No cough or hemoptysis.  Review of Systems      No headache, myalgia, PND, orthopnea.  No abdominal pain or diarrhea.  No rectal bleeding, nausea, vomiting.  Physical Exam   Vitals & Measurements    HR: 77(Peripheral)  BP: 142/84     HT: 67.25 in  WT: 253 lb  BMI: 39.33       Patient is an obese man in no distress.  Alert and oriented.  Chest is clear.  Good air movement.  Cardiac exam is regular no murmur gallop.  Abdomen is obese but nontender.  Remedies trace edema left 1+ right in the lower shin.  Little bit of skin changes suggesting venous stasis right more than left.  Varicosities right more than left.  Assessment/Plan       Chronic kidney disease stage 3 (N18.3)         Creatinine in the ED was normal with a normal EGFR         Ordered:          [id number] office outpatient visit 25 minutes (Charge), Quantity: 1, Dyspnea  Normocytic normochromic anemia  Varicose veins of legs  HTN, goal below 130/80  [place] cardiac risk <10% in next 10 years  Chronic kidney disease stage 3                Dyspnea (R06.00)         Normal troponin and BNP today. Other than asthma no significant cardiopulmonary disease identified.  No evidence for DVT, PE, pulmonary edema, pneumonia, ACS, CHF.   Appears to be due to asthma which is improving.  Will complete his course of prednisone.  Started on Advair " yesterday.  Continue as needed albuterol.         Ordered:          [id number] office outpatient visit 25 minutes (Charge), Quantity: 1, Dyspnea  HTN, goal below 130/80  Active asthma  Varicose veins of legs          43137 office outpatient visit 25 minutes (Charge), Quantity: 1, Dyspnea  Normocytic normochromic anemia  Varicose veins of legs  HTN, goal below 130/80  [place] cardiac risk <10% in next 10 years  Chronic kidney disease stage 3          B-type Natriuretic Peptide (Request), Priority: Urgent, Dyspnea          CT Chest for PE w/ Contrast (Request), Priority: STAT, Instructions: IV CONTRAST, Dyspnea          Troponin-I (Request), Priority: Urgent, Dyspnea                [place] cardiac risk <10% in next 10 years (Z91.89)         Ordered:          24158 office outpatient visit 25 minutes (Charge), Quantity: 1, Dyspnea  Normocytic normochromic anemia  Varicose veins of legs  HTN, goal below 130/80  [place] cardiac risk <10% in next 10 years  Chronic kidney disease stage 3                HTN, goal below 130/80 (I10)         Elevated but currently on high-dose prednisone.  Will recheck blood pressure in a few weeks.         Ordered:          35883 office outpatient visit 25 minutes (Charge), Quantity: 1, Dyspnea  HTN, goal below 130/80  Active asthma  Varicose veins of legs          29906 office outpatient visit 25 minutes (Charge), Quantity: 1, Dyspnea  Normocytic normochromic anemia  Varicose veins of legs  HTN, goal below 130/80  [place] cardiac risk <10% in next 10 years  Chronic kidney disease stage 3                Normocytic normochromic anemia (D64.9)         Lab workup today.         Ordered:          97459 office outpatient visit 25 minutes (Charge), Quantity: 1, Dyspnea  Normocytic normochromic anemia  Varicose veins of legs  HTN, goal below 130/80  [place] cardiac risk <10% in next 10 years  Chronic kidney disease stage 3          CBC (includes diff/plt)*  ([facility]), Specimen Type: Blood, Collection Date: 03/29/19 10:39:00 CDT          Ferritin* ([facility]), Specimen Type: Serum, Collection Date: 03/29/19 10:39:00 CDT          Folate, serum* ([facility]), Specimen Type: Serum, Collection Date: 03/29/19 10:39:00 CDT          Iron, Total and Total Iron Binding Capacity* ([facility]), Specimen Type: Serum, Collection Date: 03/29/19 10:39:00 CDT          Pathologist review of peripheral smear* ([facility]), Specimen Type: Blood, Collection Date: 03/29/19 10:44:00 CDT          Reticulocyte count* ([facility]), Specimen Type: Blood, Collection Date: 03/29/19 10:39:00 CDT          Vitamin B12 (Refrig)* ([facility]), Specimen Type: Serum, Collection Date: 03/29/19 10:39:00 CDT                Varicose veins of legs (I83.93)         Patient uses compression stockings.         Ordered:          [id number] office outpatient visit 25 minutes (Charge), Quantity: 1, Dyspnea  HTN, goal below 130/80  Active asthma  Varicose veins of legs          37992 office outpatient visit 25 minutes (Charge), Quantity: 1, Dyspnea  Normocytic normochromic anemia  Varicose veins of legs  HTN, goal below 130/80  [place] cardiac risk <10% in next 10 years  Chronic kidney disease stage 3                Orders:         fluticasone-salmeterol, 1 puff(s), INH, BID, # 60 EA, 11 Refill(s), Type: Maintenance, Pharmacy: [id number], 1 puff(s) Inhale bid, (Ordered)         Return to Clinic (Request), Return in 3-4 weeks          Lower Extremity Venous Doppler (Request), Priority: Urgent, Instructions: right, Leg edema, right  Patient Information     Name:LEONEL BUTLER      Address:      [address] [place] [id number]     Sex:Male     YOB: 1970     [phone]     Emergency Contact:JUANCARLOS BUTLER     MRN:[id number]     FIN:[id number]     Location:[facility]     Date of Service:03/29/2019      Primary Care Physician:       NONE ,        Attending Physician:       David Navarro MD, [phone]  Problem List/Past Medical History    Ongoing     Active asthma     Chronic kidney disease stage 3     [place] cardiac risk <10% in next 10 years       Comments: 10 yeaar risk 3.5%     Gout     HTN, goal below 130/80     Normocytic normochromic anemia     Obesity     Osteoarthritis     Varicose veins of legs    Historical     Normochromic anemia     Viral pericarditis  Procedure/Surgical History     Creation of pericardial window (2006)  Medications        allopurinol 300 mg oral tablet: 300 mg, 1 tab(s), Oral, daily, 90 tab(s), 3 Refill(s).        ProAir HFA 90 mcg/inh inhalation aerosol: 2 puff(s), Inhale, qid, PRN: as needed for wheezing, 3 EA, 3 Refill(s).        irbesartan 150 mg oral tablet: 1 tab(s), Oral, daily, 90 tab(s), 3 Refill(s).        amLODIPine 5 mg oral tablet: 5 mg, 1 tab(s), PO, Daily, 90 tab(s), 3 Refill(s).        predniSONE 20 mg oral tablet: 60 mg, 3 tab(s), Oral, daily, for 5 day(s), 0 Refill(s).        Advair Diskus 250 mcg-50 mcg inhalation powder: 1 puff(s), INH, BID, 60 EA, 11 Refill(s).  Allergies    Animal Dander    Dust    Mold    hydroCHLOROthiazide (Hyponatremia)    penicillins  Social History    Smoking Status - 03/28/2019     Never smoker     Alcohol      Current, 11/12/2018     Employment and Education      Employed, 11/12/2018     Home and Environment      Marital status: ., 11/12/2018     Tobacco      Never (less than 100 in lifetime), 11/12/2018  Family History    CA - Cancer of prostate: Father.    Cancer of cervix: Mother.    Epilepsy: Mother.    Gout: Father.    Hypertension: Mother and Father.  Immunizations      Vaccine Date Status      pneumococcal (PPSV23) 11/12/2018 Given      influenza virus vaccine, inactivated 11/05/2018 Recorded      influenza virus vaccine, inactivated 09/20/2017 Recorded      influenza virus vaccine, inactivated 11/11/2016 Recorded      Hep B 10/24/2014 Recorded      Hep B  09/14/2014 Recorded      varicella 01/31/2014 Recorded      varicella 12/18/2013 Recorded      tetanus/diphth/pertuss (Tdap) adult/adol 11/21/2013 Recorded      MMR (measles/mumps/rubella) 11/21/2013 Recorded      MMR (measles/mumps/rubella) 08/03/2011 Recorded      mumps 04/07/1973 Recorded      rubella 08/10/1971 Recorded      rubella 08/10/1971 Recorded      measles 08/10/1971 Recorded      IPV 02/20/1971 Recorded      IPV 1970 Recorded      DTaP 1970 Recorded      DTaP 1970 Recorded      DTaP 1970 Recorded  Lab Results       Lab Results (Last 4 results within 90 days)        Sodium Level: 137 mmol/L [135 mmol/L - 146 mmol/L] (02/05/19 14:09:00)       Potassium Level: 4.2 mmol/L [3.5 mmol/L - 5.3 mmol/L] (02/05/19 14:09:00)       Chloride Level: 101 mmol/L [98 mmol/L - 110 mmol/L] (02/05/19 14:09:00)       CO2 Level: 28 mmol/L [20 mmol/L - 32 mmol/L] (02/05/19 14:09:00)       Glucose Level: 91 mg/dL [65 mg/dL - 99 mg/dL] (02/05/19 14:09:00)       BUN: 36 mg/dL High [7 mg/dL - 25 mg/dL] (02/05/19 14:09:00)       Creatinine Level: 1.18 mg/dL [0.6 mg/dL - 1.35 mg/dL] (02/05/19 14:09:00)       BUN/Creat Ratio: 31 High [6  - 22] (02/05/19 14:09:00)       eGFR: 73 mL/min/1.73m2 (02/05/19 14:09:00)       eGFR : 84 mL/min/1.73m2 (02/05/19 14:09:00)       Calcium Level: 10.3 mg/dL [8.6 mg/dL - 10.3 mg/dL] (02/05/19 14:09:00)       Uric Acid: 5.9 mg/dL [4 mg/dL - 8 mg/dL] (02/05/19 14:09:00)       Troponin I: <0.010 (03/29/19 11:09:00)       Cholesterol: 230 mg/dL High (02/05/19 14:09:00)       Non-HDL Cholesterol: 174 High (02/05/19 14:09:00)       HDL: 56 mg/dL (02/05/19 14:09:00)       Cholesterol/HDL Ratio: 4.1 (02/05/19 14:09:00)       LDL: 149 High (02/05/19 14:09:00)       Triglyceride: 123 mg/dL (02/05/19 14:09:00)       B-Natriuretic Peptide (BNP): 98 (03/29/19 11:09:00)       PSA: 2.6 ng/mL (02/05/19 14:09:00)       WBC: 6.7 [3.8  - 10.8] (02/05/19 14:09:00)       RBC: 4.65  [4.2  - 5.8] (02/05/19 14:09:00)       Hgb: 14.5 gm/dL [13.2 gm/dL - 17.1 gm/dL] (02/05/19 14:09:00)       Hct: 42.2 % [38.5 % - 50 %] (02/05/19 14:09:00)       MCV: 90.8 fL [80 fL - 100 fL] (02/05/19 14:09:00)       MCH: 31.2 pg [27 pg - 33 pg] (02/05/19 14:09:00)       MCHC: 34.4 gm/dL [32 gm/dL - 36 gm/dL] (02/05/19 14:09:00)       RDW: 12.8 % [11 % - 15 %] (02/05/19 14:09:00)       Platelet: 209 [140  - 400] (02/05/19 14:09:00)       MPV: 9.6 fL [7.5 fL - 12.5 fL] (02/05/19 14:09:00)      Diagnostic Results   Ultrasound of the right leg negative.  Yesterday EKG unchanged and chest CT PE negative.   From his ED visit mild chronic normocytic anemia, unremarkable BMP, negative chest x-ray nonischemic appearing EKG.

## 2022-02-16 NOTE — PROGRESS NOTES
Chief Complaint    follow up ER, continued labored breathing. continued from tuesday. not much improvement. right foot and leg swelling, satuday. just came back from a 10 day trip from [facility]  History of Present Illness      Patient returned from 12-day trip to [place] 1 week ago.  He was well upon his return however about 3 days later developed wheezing and shortness of breath.  No chest pressure or pain.  No fever or chills.  He has noticed some leg edema.  He was seen in the emergency department 2 days ago and discharged on prednisone 660 mg daily for 5 days.  He has a history of asthma and uses albuterol on a quite intermittent basis.  He had been on Advair but has been off that for years.  His asthma seem to improve when he lost weight.  Review of Systems      No headache, myalgia, fever, chills, abdominal pain, nausea, vomiting, diarrhea.  Physical Exam   Vitals & Measurements    HR: 84(Peripheral)  BP: 150/90  SpO2: 97%     WT: 275 lb       Patient is an obese man in no acute distress.  Not tachypneic.  Speaks in complete sentences.  HEENT exam is unremarkable.  Neck supple no adenopathy thyromegaly.  Carotid pulsations normal.  Chest clear to auscultation and percussion no wheezes or rales.  Cardiac exam is regular no murmur gallop.  1+ edema right lower extremity trace left.  Assessment/Plan       Active asthma (J45.909)         Will add Advair.  Complete prednisone taper.  Will reassess tomorrow.         Ordered:          12200 office outpatient visit 25 minutes (Charge), Quantity: 1, Dyspnea  HTN, goal below 130/80  Active asthma  Varicose veins of legs                Dyspnea (R06.00)         Workup I think is ruled out pulmonary embolism, acute coronary syndrome, congestive heart failure, pneumonia.  Appears to be an asthma exacerbation he had wheezing which is improved with therapy.         Ordered:          99553 office outpatient visit 25 minutes (Charge), Quantity: 1, Dyspnea  HTN, goal below  130/80  Active asthma  Varicose veins of legs          CT Chest for PE w/ Contrast (Request), Priority: STAT, Instructions: IV CONTRAST, Dyspnea                HTN, goal below 130/80 (I10)         Not controlled by today's reading.         Ordered:          79477 office outpatient visit 25 minutes (Charge), Quantity: 1, Dyspnea  HTN, goal below 130/80  Active asthma  Varicose veins of legs                Leg edema, right (R60.0)         Ordered:          US Lower Extremity Venous Doppler (Request), Priority: Urgent, Instructions: right, Leg edema, right                Normocytic normochromic anemia (D64.9)         Lab workup.                Varicose veins of legs (I83.93)         Edema which is asymmetric.  Will get an ultrasound given his recent travel history.         Ordered:          34058 office outpatient visit 25 minutes (Charge), Quantity: 1, Dyspnea  HTN, goal below 130/80  Active asthma  Varicose veins of legs                Orders:         fluticasone-salmeterol, 1 puff(s), INH, BID, # 60 EA, 11 Refill(s), Type: Maintenance, Pharmacy: [facility] [id number], 1 puff(s) Inhale bid, (Ordered)  Patient Information     Name:LEONEL BUTLER      Address:      [address] [place] [id number]     Sex:Male     YOB: 1970     [phone]     Emergency Contact:JUANCARLOS BUTLER     MRN:[id number]     FIN:[id number]     Location:[facility]     Date of Service:03/28/2019      Primary Care Physician:       NONE ,       Attending Physician:       David Navarro MD, [phone]  Problem List/Past Medical History    Ongoing     Active asthma     Chronic kidney disease stage 3     [place] cardiac risk <10% in next 10 years       Comments: 10 yeaar risk 3.5%     Gout     HTN, goal below 130/80     Obesity     Osteoarthritis     Varicose veins of legs    Historical     Normochromic anemia     Viral pericarditis  Procedure/Surgical  History     Creation of pericardial window (2006)  Medications        allopurinol 300 mg oral tablet: 300 mg, 1 tab(s), Oral, daily, 90 tab(s), 3 Refill(s).        ProAir HFA 90 mcg/inh inhalation aerosol: 2 puff(s), Inhale, qid, PRN: as needed for wheezing, 3 EA, 3 Refill(s).        irbesartan 150 mg oral tablet: 1 tab(s), Oral, daily, 90 tab(s), 3 Refill(s).        amLODIPine 5 mg oral tablet: 5 mg, 1 tab(s), PO, Daily, 90 tab(s), 3 Refill(s).        predniSONE 20 mg oral tablet: 60 mg, 3 tab(s), Oral, daily, for 5 day(s), 0 Refill(s).  Allergies    Animal Dander    Dust    Mold    hydroCHLOROthiazide (Hyponatremia)    penicillins  Social History    Smoking Status - 03/28/2019     Never smoker     Alcohol      Current, 11/12/2018     Employment and Education      Employed, 11/12/2018     Home and Environment      Marital status: ., 11/12/2018     Tobacco      Never (less than 100 in lifetime), 11/12/2018  Family History    CA - Cancer of prostate: Father.    Cancer of cervix: Mother.    Epilepsy: Mother.    Gout: Father.    Hypertension: Mother and Father.  Immunizations      Vaccine Date Status      pneumococcal (PPSV23) 11/12/2018 Given      influenza virus vaccine, inactivated 11/05/2018 Recorded      influenza virus vaccine, inactivated 09/20/2017 Recorded      influenza virus vaccine, inactivated 11/11/2016 Recorded      Hep B 10/24/2014 Recorded      Hep B 09/14/2014 Recorded      varicella 01/31/2014 Recorded      varicella 12/18/2013 Recorded      tetanus/diphth/pertuss (Tdap) adult/adol 11/21/2013 Recorded      MMR (measles/mumps/rubella) 11/21/2013 Recorded      MMR (measles/mumps/rubella) 08/03/2011 Recorded      mumps 04/07/1973 Recorded      rubella 08/10/1971 Recorded      rubella 08/10/1971 Recorded      measles 08/10/1971 Recorded      IPV 02/20/1971 Recorded      IPV 1970 Recorded      DTaP 1970 Recorded      DTaP 1970 Recorded      DTaP 1970 Recorded  Lab Results           Lab Results (Last 4 results within 90 days)          Sodium Level: 137 mmol/L [135 mmol/L - 146 mmol/L] (02/05/19 14:09:00)          Potassium Level: 4.2 mmol/L [3.5 mmol/L - 5.3 mmol/L] (02/05/19 14:09:00)          Chloride Level: 101 mmol/L [98 mmol/L - 110 mmol/L] (02/05/19 14:09:00)          CO2 Level: 28 mmol/L [20 mmol/L - 32 mmol/L] (02/05/19 14:09:00)          Glucose Level: 91 mg/dL [65 mg/dL - 99 mg/dL] (02/05/19 14:09:00)          BUN: 36 mg/dL High [7 mg/dL - 25 mg/dL] (02/05/19 14:09:00)          Creatinine Level: 1.18 mg/dL [0.6 mg/dL - 1.35 mg/dL] (02/05/19 14:09:00)          BUN/Creat Ratio: 31 High [6  - 22] (02/05/19 14:09:00)          eGFR: 73 mL/min/1.73m2 (02/05/19 14:09:00)          eGFR African American: 84 mL/min/1.73m2 (02/05/19 14:09:00)          Calcium Level: 10.3 mg/dL [8.6 mg/dL - 10.3 mg/dL] (02/05/19 14:09:00)          Uric Acid: 5.9 mg/dL [4 mg/dL - 8 mg/dL] (02/05/19 14:09:00)          Cholesterol: 230 mg/dL High (02/05/19 14:09:00)          Non-HDL Cholesterol: 174 High (02/05/19 14:09:00)          HDL: 56 mg/dL (02/05/19 14:09:00)          Cholesterol/HDL Ratio: 4.1 (02/05/19 14:09:00)          LDL: 149 High (02/05/19 14:09:00)          Triglyceride: 123 mg/dL (02/05/19 14:09:00)          PSA: 2.6 ng/mL (02/05/19 14:09:00)          WBC: 6.7 [3.8  - 10.8] (02/05/19 14:09:00)          RBC: 4.65 [4.2  - 5.8] (02/05/19 14:09:00)          Hgb: 14.5 gm/dL [13.2 gm/dL - 17.1 gm/dL] (02/05/19 14:09:00)          Hct: 42.2 % [38.5 % - 50 %] (02/05/19 14:09:00)          MCV: 90.8 fL [80 fL - 100 fL] (02/05/19 14:09:00)          MCH: 31.2 pg [27 pg - 33 pg] (02/05/19 14:09:00)          MCHC: 34.4 gm/dL [32 gm/dL - 36 gm/dL] (02/05/19 14:09:00)          RDW: 12.8 % [11 % - 15 %] (02/05/19 14:09:00)          Platelet: 209 [140  - 400] (02/05/19 14:09:00)          MPV: 9.6 fL [7.5 fL - 12.5 fL] (02/05/19 14:09:00)  Diagnostic Results   CT chest PE protocol tonight is negative.  EKG  tonight reveals a sinus rhythm with a rate of 63 and is unchanged from the EKG of March 26.   From March 26 ED visit electrocardiogram.  Chest x-ray negative.  Anemia with a hemoglobin of 12.6 with a normal MCV white count platelets and differential.  Basic metabolic profile is normal with a creatinine of 1.07.

## 2022-02-16 NOTE — TELEPHONE ENCOUNTER
Entered by Nettie Arceo CMA on November 27, 2020 2:36:53 PM CST  PCP:   [id number]    Medication:   Albuterol   Last Filled:  12/23/2019    Quantity:  3  Refills:  3    Date of last office visit and reason:   12/23/2019 HTN  Date of last labs pertaining to condition:  n/a    Note:  Please advise on refill/quantity. When do you want to see patient?    Return to Clinic order placed?  labs 02/2021    Resource:   eRx pool  Phone:   [phone]       ------------------------------------------  From: [facility] #[id number]  To: David Navarro MD  Sent: November 27, 2020 3:44:35 AM CST  Subject: Medication Management  Due: November 26, 2020 5:03:39 PM CST     ** On Hold Pending Signature **     Dispensed Drug: albuterol (ProAir HFA 90 mcg/inh inhalation aerosol), INHALE TWO PUFFS BY MOUTH FOUR TIMES DAILY AS NEEDED FOR WHEEZING  Quantity: 25.5 gm  Days Supply: 75  Refills: 0  Substitutions Allowed  Notes from Pharmacy:  ---------------------------------------------------------------  From: Nettie Arceo CMA ([facility] ([id number]   To: [facility] ([id number]);     Sent: 11/27/2020 2:37:07 PM CST  Subject: FW: Medication Management   Due Date/Time: 11/28/2020 3:44:00 AM CST---------------------  From: Nettie Arceo CMA ([employer] [facility] ([id number]   To: David Navarro MD;     Sent: 11/27/2020 2:47:01 PM CST  Subject: FW: Medication Management   Due Date/Time: 11/28/2020 3:44:00 AM CST---------------------  From: David Navarro MD   To: [facility] #[id number]    Sent: 11/27/2020 2:55:42 PM CST  Subject: FW: Medication Management     ** Submitted: **  Complete:albuterol (ProAir HFA 90 mcg/inh inhalation aerosol)   Signed by David Navarro MD  11/27/2020 8:55:00 PM [facility], [place]    ** Approved with modifications: **  albuterol (PROAIR HFA ORAL INH (200  PFS) 8.5G)  INHALE TWO PUFFS BY MOUTH FOUR TIMES DAILY AS NEEDED FOR WHEEZING  Qty:  25.5 gm        Days Supply:  75         Refills:  12          Substitutions Allowed     Route To Pharmacy - [facility] #[id number]

## 2022-02-16 NOTE — NURSING NOTE
Comprehensive Intake Entered On:  2/8/2019 1:30 PM CST    Performed On:  2/8/2019 1:24 PM CST by Perri Contreras               Summary   Chief Complaint :   f/u lab results/HTN med check, brought at home bp readings with him   Weight Measured :   253.5 lb(Converted to: 253 lb 8 oz, 114.99 kg)    Height Measured :   67.25 in(Converted to: 5 ft 7 in, 170.81 cm)    Body Mass Index :   39.4 kg/m2 (HI)    Body Surface Area :   2.33 m2   Systolic Blood Pressure :   130 mmHg   Diastolic Blood Pressure :   84 mmHg (HI)    Mean Arterial Pressure :   99 mmHg   Peripheral Pulse Rate :   76 bpm   BP Site :   Right arm   Pulse Site :   Radial artery   BP Method :   Manual   HR Method :   Manual   Temperature Tympanic :   98.4 DegF(Converted to: 36.9 DegC)    Perri Contreras - 2/8/2019 1:24 PM CST   Health Status   Allergies Verified? :   Yes   Medication History Verified? :   Yes   Medical History Verified? :   Yes   Pre-Visit Planning Status :   Completed   Tobacco Use? :   Never smoker   Perri Contreras - 2/8/2019 1:24 PM CST   Consents   Consent for Immunization Exchange :   Consent Granted   Consent for Immunizations to Providers :   Consent Granted   Perri Contreras - 2/8/2019 1:24 PM CST   Meds / Allergies   (As Of: 2/8/2019 1:30:09 PM CST)   Allergies (Active)   Animal Dander  Estimated Onset Date:   Unspecified ; Comments:     Comment 1: cat hair   ; Created By:   Anentta Bradshaw; Reaction Status:   Active ; Category:   Other ; Substance:   Animal Dander ; Type:   Allergy ; Updated By:   Annetta Bradshaw; Reviewed Date:   12/6/2018 2:58 PM CST      Dust  Estimated Onset Date:   Unspecified ; Created By:   Annetta Bradshaw; Reaction Status:   Active ; Category:   Environment ; Substance:   Dust ; Type:   Allergy ; Updated By:   Annetta Bradshaw; Reviewed Date:   12/6/2018 2:58 PM CST      hydroCHLOROthiazide  Estimated Onset Date:   Unspecified ; Reactions:   Hyponatremia ; Created By:   David Navarro MD; Reaction  Status:   Active ; Category:   Drug ; Substance:   hydroCHLOROthiazide ; Type:   Allergy ; Updated By:   David Navarro MD; Reviewed Date:   11/12/2018 9:17 AM CST      Mold  Estimated Onset Date:   Unspecified ; Created By:   Annetta Bradshaw; Reaction Status:   Active ; Category:   Environment ; Substance:   Mold ; Type:   Allergy ; Updated By:   Annetta Bradshaw; Reviewed Date:   12/6/2018 2:57 PM CST      penicillins  Estimated Onset Date:   Unspecified ; Created By:   Genesis Verma MA; Reaction Status:   Active ; Category:   Drug ; Substance:   penicillins ; Type:   Allergy ; Updated By:   Genesis Verma MA; Reviewed Date:   11/12/2018 9:17 AM CST        Medication List   (As Of: 2/8/2019 1:30:09 PM CST)   Prescription/Discharge Order    irbesartan  :   irbesartan ; Status:   Prescribed ; Ordered As Mnemonic:   irbesartan 150 mg oral tablet ; Simple Display Line:   1 tab(s), Oral, daily, 30 tab(s), 0 Refill(s) ; Ordering Provider:   David Navarro MD; Catalog Code:   irbesartan ; Order Dt/Tm:   1/31/2019 2:54:01 PM          amLODIPine  :   amLODIPine ; Status:   Prescribed ; Ordered As Mnemonic:   amLODIPine 10 mg oral tablet ; Simple Display Line:   5 mg, 0.5 tab(s), Oral, daily, for 90 day(s), 45 tab(s), 3 Refill(s) ; Ordering Provider:   David Navarro MD; Catalog Code:   amLODIPine ; Order Dt/Tm:   12/19/2018 12:16:38 PM          albuterol  :   albuterol ; Status:   Prescribed ; Ordered As Mnemonic:   ProAir HFA 90 mcg/inh inhalation aerosol ; Simple Display Line:   2 puff(s), Inhale, qid, PRN: as needed for wheezing, 3 EA, 3 Refill(s) ; Ordering Provider:   David Navarro MD; Catalog Code:   albuterol ; Order Dt/Tm:   11/12/2018 8:46:06 AM          allopurinol  :   allopurinol ; Status:   Prescribed ; Ordered As Mnemonic:   allopurinol 300 mg oral tablet ; Simple Display Line:   300 mg, 1 tab(s), Oral, daily, 90 tab(s), 3 Refill(s) ; Ordering Provider:   David Navarro MD; Catalog Code:   allopurinol ; Order  Dt/Tm:   11/12/2018 8:45:49 AM

## 2022-02-16 NOTE — CARE COORDINATION
ACTION PLAN   Goal(s):        Today s Date:                                                                                   Goal(s) completion date:      Steps to be taken to manage BP When will I accomplish these steps Barriers Status   (12/18/18) BP elevated. On Amlodipine-add Irbesartan 150mg qd and f/u with [facility] in 2 weeks along w/ BMP.         Steps to be taken When will I accomplish these steps Barriers Status             Steps to be taken When will I accomplish these steps Barriers Status             Steps to be taken When will I accomplish these steps Barriers Status

## 2022-02-16 NOTE — TELEPHONE ENCOUNTER
---------------------From: Francoise Cortez To:  [email or web address]>;   Sent: 5/15/2019 6:33:52 AM CDTSubject: General Message Patient: LEONEL BUTLER; YOB: 1970 The attached Referral Note is for an appointment scheduled at [facility] with Dr. Malhotra 5/20/19 at 2:00.